# Patient Record
Sex: FEMALE | URBAN - METROPOLITAN AREA
[De-identification: names, ages, dates, MRNs, and addresses within clinical notes are randomized per-mention and may not be internally consistent; named-entity substitution may affect disease eponyms.]

---

## 2024-07-23 ENCOUNTER — TRANSCRIBE ORDERS (OUTPATIENT)
Facility: HOSPITAL | Age: 59
End: 2024-07-23

## 2024-07-23 DIAGNOSIS — R10.10 UPPER ABDOMINAL PAIN: Primary | ICD-10-CM

## 2024-07-24 ENCOUNTER — TRANSCRIBE ORDERS (OUTPATIENT)
Facility: HOSPITAL | Age: 59
End: 2024-07-24

## 2024-07-24 DIAGNOSIS — Z12.31 ENCOUNTER FOR MAMMOGRAM TO ESTABLISH BASELINE MAMMOGRAM: Primary | ICD-10-CM

## 2024-07-30 ENCOUNTER — HOSPITAL ENCOUNTER (OUTPATIENT)
Facility: HOSPITAL | Age: 59
Discharge: HOME | End: 2024-08-02
Attending: INTERNAL MEDICINE
Payer: MEDICARE

## 2024-07-30 DIAGNOSIS — R10.10 UPPER ABDOMINAL PAIN: ICD-10-CM

## 2024-07-30 PROCEDURE — 76700 US EXAM ABDOM COMPLETE: CPT

## 2024-08-01 ENCOUNTER — TRANSCRIBE ORDERS (OUTPATIENT)
Facility: HOSPITAL | Age: 59
End: 2024-08-01

## 2024-08-01 ENCOUNTER — HOSPITAL ENCOUNTER (OUTPATIENT)
Facility: HOSPITAL | Age: 59
Discharge: HOME | End: 2024-08-01
Payer: MEDICARE

## 2024-08-01 DIAGNOSIS — M25.522 LEFT ELBOW PAIN: Primary | ICD-10-CM

## 2024-08-01 DIAGNOSIS — M25.522 LEFT ELBOW PAIN: ICD-10-CM

## 2024-08-01 PROCEDURE — 72050 X-RAY EXAM NECK SPINE 4/5VWS: CPT

## 2024-08-01 PROCEDURE — 72100 X-RAY EXAM L-S SPINE 2/3 VWS: CPT

## 2024-08-01 PROCEDURE — 74018 RADEX ABDOMEN 1 VIEW: CPT

## 2024-08-01 PROCEDURE — 73080 X-RAY EXAM OF ELBOW: CPT

## 2024-08-20 ENCOUNTER — HOSPITAL ENCOUNTER (OUTPATIENT)
Facility: HOSPITAL | Age: 59
Discharge: HOME | End: 2024-08-23
Attending: INTERNAL MEDICINE
Payer: MEDICARE

## 2024-08-20 DIAGNOSIS — Z12.31 ENCOUNTER FOR MAMMOGRAM TO ESTABLISH BASELINE MAMMOGRAM: ICD-10-CM

## 2024-08-20 PROCEDURE — 77063 BREAST TOMOSYNTHESIS BI: CPT

## 2024-08-30 ENCOUNTER — TELEPHONE (OUTPATIENT)
Age: 59
End: 2024-08-30

## 2024-09-03 PROBLEM — N20.0 RENAL STONE: Status: ACTIVE | Noted: 2024-09-03

## 2024-09-04 ENCOUNTER — OFFICE VISIT (OUTPATIENT)
Age: 59
End: 2024-09-04

## 2024-09-04 VITALS
HEART RATE: 76 BPM | DIASTOLIC BLOOD PRESSURE: 68 MMHG | HEIGHT: 64 IN | WEIGHT: 147 LBS | SYSTOLIC BLOOD PRESSURE: 102 MMHG | BODY MASS INDEX: 25.1 KG/M2

## 2024-09-04 DIAGNOSIS — R82.81 PYURIA: ICD-10-CM

## 2024-09-04 DIAGNOSIS — N20.0 RENAL STONE: Primary | ICD-10-CM

## 2024-09-04 DIAGNOSIS — N23 RENAL COLIC ON RIGHT SIDE: ICD-10-CM

## 2024-09-04 LAB
BILIRUBIN, URINE, POC: NEGATIVE
BLOOD URINE, POC: NEGATIVE
GLUCOSE URINE, POC: NEGATIVE
KETONES, URINE, POC: NEGATIVE
LEUKOCYTE ESTERASE, URINE, POC: NORMAL
NITRITE, URINE, POC: NEGATIVE
PH, URINE, POC: 5.5 (ref 4.6–8)
PROTEIN,URINE, POC: NEGATIVE
SPECIFIC GRAVITY, URINE, POC: 1.02 (ref 1–1.03)
URINALYSIS CLARITY, POC: CLEAR
URINALYSIS COLOR, POC: YELLOW
UROBILINOGEN, POC: NORMAL

## 2024-09-04 RX ORDER — OMEPRAZOLE 40 MG/1
1 CAPSULE, DELAYED RELEASE ORAL
COMMUNITY

## 2024-09-04 RX ORDER — TRAZODONE HYDROCHLORIDE 100 MG/1
TABLET ORAL
COMMUNITY

## 2024-09-04 RX ORDER — SEMAGLUTIDE 1.34 MG/ML
INJECTION, SOLUTION SUBCUTANEOUS
COMMUNITY
Start: 2024-07-11

## 2024-09-04 RX ORDER — LINACLOTIDE 290 UG/1
CAPSULE, GELATIN COATED ORAL
COMMUNITY
Start: 2020-10-07

## 2024-09-04 RX ORDER — KETOROLAC TROMETHAMINE 10 MG/1
10 TABLET, FILM COATED ORAL EVERY 6 HOURS PRN
Qty: 20 TABLET | Refills: 0 | Status: SHIPPED | OUTPATIENT
Start: 2024-09-04

## 2024-09-04 RX ORDER — METFORMIN HCL 500 MG
1 TABLET, EXTENDED RELEASE 24 HR ORAL 2 TIMES DAILY
COMMUNITY

## 2024-09-04 RX ORDER — LURASIDONE HYDROCHLORIDE 60 MG/1
TABLET, FILM COATED ORAL
COMMUNITY

## 2024-09-04 RX ORDER — OXYCODONE HYDROCHLORIDE 10 MG/1
TABLET ORAL
COMMUNITY
Start: 2024-08-20

## 2024-09-04 RX ORDER — SIMVASTATIN 40 MG
TABLET ORAL
COMMUNITY
Start: 2024-08-01

## 2024-09-04 RX ORDER — CLONAZEPAM 0.5 MG/1
TABLET ORAL
COMMUNITY
Start: 2024-08-28

## 2024-09-04 RX ORDER — CELECOXIB 200 MG/1
200 CAPSULE ORAL DAILY
COMMUNITY

## 2024-09-04 RX ORDER — CIPROFLOXACIN 500 MG/1
500 TABLET, FILM COATED ORAL 2 TIMES DAILY
Qty: 20 TABLET | Refills: 0 | Status: SHIPPED | OUTPATIENT
Start: 2024-09-04 | End: 2024-09-14

## 2024-09-04 RX ORDER — LEVOTHYROXINE SODIUM 25 UG/1
TABLET ORAL
COMMUNITY

## 2024-09-04 RX ORDER — PRAZOSIN HYDROCHLORIDE 1 MG/1
CAPSULE ORAL
COMMUNITY
Start: 2024-08-28

## 2024-09-04 RX ORDER — BISACODYL 5 MG/1
TABLET, DELAYED RELEASE ORAL
COMMUNITY
Start: 2024-04-22

## 2024-09-04 ASSESSMENT — ENCOUNTER SYMPTOMS: BACK PAIN: 1

## 2024-09-04 NOTE — PROGRESS NOTES
ciprofloxacin (CIPRO) 500 MG tablet; Take 1 tablet by mouth 2 times daily for 10 days, Disp-20 tablet, R-0Normal        Reed White MD      Please note that portions of this note was potentially completed with Dragon dictation, the computer voice recognition software.  Quite often unanticipated grammatical, syntax, homophones, and other interpretive errors are inadvertently transcribed by the computer software.  Please disregard these errors.  Please excuse any errors that have escaped final proofreading.  Thank you.

## 2024-09-06 LAB — BACTERIA UR CULT: NO GROWTH

## 2024-09-10 ENCOUNTER — TELEPHONE (OUTPATIENT)
Age: 59
End: 2024-09-10

## 2024-09-11 ENCOUNTER — HOSPITAL ENCOUNTER (OUTPATIENT)
Facility: HOSPITAL | Age: 59
Discharge: HOME | End: 2024-09-14
Attending: UROLOGY
Payer: MEDICARE

## 2024-09-11 DIAGNOSIS — N20.0 RENAL STONE: ICD-10-CM

## 2024-09-11 DIAGNOSIS — R82.81 PYURIA: ICD-10-CM

## 2024-09-11 PROCEDURE — 74176 CT ABD & PELVIS W/O CONTRAST: CPT

## 2024-09-18 ENCOUNTER — OFFICE VISIT (OUTPATIENT)
Age: 59
End: 2024-09-18

## 2024-09-18 ENCOUNTER — TELEPHONE (OUTPATIENT)
Age: 59
End: 2024-09-18

## 2024-09-18 VITALS — HEART RATE: 81 BPM | DIASTOLIC BLOOD PRESSURE: 76 MMHG | SYSTOLIC BLOOD PRESSURE: 117 MMHG

## 2024-09-18 DIAGNOSIS — N20.0 RENAL STONE: Primary | ICD-10-CM

## 2024-09-18 DIAGNOSIS — N23 RENAL COLIC ON RIGHT SIDE: ICD-10-CM

## 2024-09-18 LAB
BILIRUBIN, URINE, POC: NEGATIVE
BLOOD URINE, POC: NEGATIVE
GLUCOSE URINE, POC: NEGATIVE
KETONES, URINE, POC: NEGATIVE
LEUKOCYTE ESTERASE, URINE, POC: ABNORMAL
NITRITE, URINE, POC: NEGATIVE
PH, URINE, POC: 7 (ref 4.6–8)
PROTEIN,URINE, POC: NEGATIVE
SPECIFIC GRAVITY, URINE, POC: 1.01 (ref 1–1.03)
URINALYSIS CLARITY, POC: CLEAR
URINALYSIS COLOR, POC: YELLOW
UROBILINOGEN, POC: ABNORMAL

## 2024-09-19 ENCOUNTER — TELEPHONE (OUTPATIENT)
Age: 59
End: 2024-09-19

## 2024-09-19 ENCOUNTER — PREP FOR PROCEDURE (OUTPATIENT)
Age: 59
End: 2024-09-19

## 2024-09-19 RX ORDER — KETOROLAC TROMETHAMINE 10 MG/1
10 TABLET, FILM COATED ORAL EVERY 6 HOURS PRN
Qty: 20 TABLET | Refills: 0 | Status: SHIPPED | OUTPATIENT
Start: 2024-09-19

## 2024-09-19 RX ORDER — KETOROLAC TROMETHAMINE 10 MG/1
10 TABLET, FILM COATED ORAL EVERY 6 HOURS PRN
Qty: 12 TABLET | Refills: 0 | Status: SHIPPED | OUTPATIENT
Start: 2024-09-19 | End: 2024-09-19 | Stop reason: SDUPTHER

## 2024-09-24 DIAGNOSIS — N20.0 RENAL STONE: Primary | ICD-10-CM

## 2024-10-03 ENCOUNTER — HOSPITAL ENCOUNTER (OUTPATIENT)
Facility: HOSPITAL | Age: 59
Discharge: HOME | End: 2024-10-06
Payer: MEDICARE

## 2024-10-03 VITALS
HEIGHT: 65 IN | HEART RATE: 83 BPM | BODY MASS INDEX: 24.66 KG/M2 | OXYGEN SATURATION: 97 % | SYSTOLIC BLOOD PRESSURE: 131 MMHG | WEIGHT: 148 LBS | TEMPERATURE: 98.7 F | RESPIRATION RATE: 18 BRPM | DIASTOLIC BLOOD PRESSURE: 90 MMHG

## 2024-10-03 LAB
ABO + RH BLD: NORMAL
ANION GAP SERPL CALC-SCNC: 6 MMOL/L (ref 2–12)
APPEARANCE UR: CLEAR
BACTERIA URNS QL MICRO: NEGATIVE /HPF
BILIRUB UR QL: NEGATIVE
BLOOD GROUP ANTIBODIES SERPL: NEGATIVE
BUN SERPL-MCNC: 16 MG/DL (ref 6–20)
BUN/CREAT SERPL: 16 (ref 12–20)
CA-I BLD-MCNC: 10 MG/DL (ref 8.5–10.1)
CHLORIDE SERPL-SCNC: 102 MMOL/L (ref 97–108)
CO2 SERPL-SCNC: 25 MMOL/L (ref 21–32)
COLOR UR: ABNORMAL
CREAT SERPL-MCNC: 1.02 MG/DL (ref 0.55–1.02)
EPITH CASTS URNS QL MICRO: ABNORMAL /LPF
ERYTHROCYTE [DISTWIDTH] IN BLOOD BY AUTOMATED COUNT: 12.5 % (ref 11.5–14.5)
GLUCOSE SERPL-MCNC: 85 MG/DL (ref 65–100)
GLUCOSE UR STRIP.AUTO-MCNC: NEGATIVE MG/DL
HCT VFR BLD AUTO: 38.8 % (ref 35–47)
HGB BLD-MCNC: 12.9 G/DL (ref 11.5–16)
HGB UR QL STRIP: ABNORMAL
KETONES UR QL STRIP.AUTO: NEGATIVE MG/DL
LEUKOCYTE ESTERASE UR QL STRIP.AUTO: NEGATIVE
MCH RBC QN AUTO: 29.5 PG (ref 26–34)
MCHC RBC AUTO-ENTMCNC: 33.2 G/DL (ref 30–36.5)
MCV RBC AUTO: 88.8 FL (ref 80–99)
MRSA DNA SPEC QL NAA+PROBE: NOT DETECTED
MUCOUS THREADS URNS QL MICRO: ABNORMAL /LPF
NITRITE UR QL STRIP.AUTO: NEGATIVE
NRBC # BLD: 0 K/UL (ref 0–0.01)
NRBC BLD-RTO: 0 PER 100 WBC
PH UR STRIP: 6 (ref 5–8)
PLATELET # BLD AUTO: 349 K/UL (ref 150–400)
PMV BLD AUTO: 8.6 FL (ref 8.9–12.9)
POTASSIUM SERPL-SCNC: 4.3 MMOL/L (ref 3.5–5.1)
PROT UR STRIP-MCNC: NEGATIVE MG/DL
RBC # BLD AUTO: 4.37 M/UL (ref 3.8–5.2)
RBC #/AREA URNS HPF: ABNORMAL /HPF (ref 0–5)
SODIUM SERPL-SCNC: 133 MMOL/L (ref 136–145)
SP GR UR REFRACTOMETRY: 1.01 (ref 1–1.03)
SPECIMEN EXP DATE BLD: NORMAL
UROBILINOGEN UR QL STRIP.AUTO: 0.1 EU/DL (ref 0.1–1)
WBC # BLD AUTO: 4.6 K/UL (ref 3.6–11)
WBC URNS QL MICRO: ABNORMAL /HPF (ref 0–4)

## 2024-10-03 PROCEDURE — 85027 COMPLETE CBC AUTOMATED: CPT

## 2024-10-03 PROCEDURE — 81001 URINALYSIS AUTO W/SCOPE: CPT

## 2024-10-03 PROCEDURE — 86850 RBC ANTIBODY SCREEN: CPT

## 2024-10-03 PROCEDURE — 36415 COLL VENOUS BLD VENIPUNCTURE: CPT

## 2024-10-03 PROCEDURE — 87086 URINE CULTURE/COLONY COUNT: CPT

## 2024-10-03 PROCEDURE — 93005 ELECTROCARDIOGRAM TRACING: CPT | Performed by: UROLOGY

## 2024-10-03 PROCEDURE — 83036 HEMOGLOBIN GLYCOSYLATED A1C: CPT

## 2024-10-03 PROCEDURE — 86900 BLOOD TYPING SEROLOGIC ABO: CPT

## 2024-10-03 PROCEDURE — 87641 MR-STAPH DNA AMP PROBE: CPT

## 2024-10-03 PROCEDURE — 80048 BASIC METABOLIC PNL TOTAL CA: CPT

## 2024-10-03 PROCEDURE — 86901 BLOOD TYPING SEROLOGIC RH(D): CPT

## 2024-10-03 RX ORDER — LURASIDONE HYDROCHLORIDE 80 MG/1
80 TABLET, FILM COATED ORAL 2 TIMES DAILY
Status: ON HOLD | COMMUNITY
End: 2024-10-09 | Stop reason: HOSPADM

## 2024-10-03 RX ORDER — BACLOFEN 10 MG/1
10 TABLET ORAL 3 TIMES DAILY
Status: ON HOLD | COMMUNITY
End: 2024-10-09 | Stop reason: HOSPADM

## 2024-10-03 RX ORDER — OXYCODONE AND ACETAMINOPHEN 10; 325 MG/1; MG/1
4 TABLET ORAL EVERY 4 HOURS PRN
Status: ON HOLD | COMMUNITY
End: 2024-10-09 | Stop reason: HOSPADM

## 2024-10-03 ASSESSMENT — PAIN SCALES - GENERAL: PAINLEVEL_OUTOF10: 3

## 2024-10-03 NOTE — PERIOP NOTE
1603- Dr. Hanson anesthesiologist called made aware of patient's EKG result from today 10/03/24. Dr. Hanson stated to have patient's PCP review and sign off with medical clearance if able.     Dr. Santillan's office called confirmed patient is established, fax number received, clearance form and EKG result faxed, awaiting clearance.     Dr. White's office called spoke with his  Lynne made her aware of information above in regards to EKG and made aware of abnormal lab BMP-- Sodium 133. Lynne stated she will make Dr. White aware and call back to PAT if further order received.     Will follow up with PCP office.

## 2024-10-03 NOTE — PERIOP NOTE
1418- Patient stated during PAT that she is currently holding Celebrex prior to surgery scheduled for 10/8/24.

## 2024-10-04 LAB
BACTERIA SPEC CULT: NORMAL
EKG ATRIAL RATE: 82 BPM
EKG DIAGNOSIS: NORMAL
EKG P AXIS: 78 DEGREES
EKG P-R INTERVAL: 174 MS
EKG Q-T INTERVAL: 354 MS
EKG QRS DURATION: 84 MS
EKG QTC CALCULATION (BAZETT): 413 MS
EKG R AXIS: 47 DEGREES
EKG T AXIS: 80 DEGREES
EKG VENTRICULAR RATE: 82 BPM
Lab: NORMAL

## 2024-10-04 NOTE — PERIOP NOTE
Followed up with Dr. White in regards to patient's Sodium level of 133. Orders received to repeat BMP DOS.     Also followed up with Dr. Santillan's office in regards to patient's medical clearance. Dalia with Dr. Santillan's office staff stated will have clearance form faxed back to PAT as soon as Dr. Santillan has had time to review.

## 2024-10-05 LAB
EST. AVERAGE GLUCOSE BLD GHB EST-MCNC: 103 MG/DL
HBA1C MFR BLD: 5.2 % (ref 4–5.6)

## 2024-10-07 ENCOUNTER — HOSPITAL ENCOUNTER (OUTPATIENT)
Facility: HOSPITAL | Age: 59
Discharge: HOME | End: 2024-10-10
Attending: UROLOGY
Payer: MEDICARE

## 2024-10-07 ENCOUNTER — ANESTHESIA EVENT (OUTPATIENT)
Facility: HOSPITAL | Age: 59
End: 2024-10-07
Payer: MEDICARE

## 2024-10-07 VITALS
RESPIRATION RATE: 18 BRPM | SYSTOLIC BLOOD PRESSURE: 104 MMHG | OXYGEN SATURATION: 98 % | DIASTOLIC BLOOD PRESSURE: 69 MMHG | HEART RATE: 74 BPM | TEMPERATURE: 98.8 F

## 2024-10-07 DIAGNOSIS — R82.81 PYURIA: ICD-10-CM

## 2024-10-07 DIAGNOSIS — N20.0 RENAL STONE: ICD-10-CM

## 2024-10-07 LAB
ANION GAP SERPL CALC-SCNC: 10 MMOL/L (ref 2–12)
BASOPHILS # BLD: 0 K/UL (ref 0–0.1)
BASOPHILS NFR BLD: 1 % (ref 0–1)
BUN SERPL-MCNC: 17 MG/DL (ref 6–20)
BUN/CREAT SERPL: 19 (ref 12–20)
CA-I BLD-MCNC: 9.8 MG/DL (ref 8.5–10.1)
CHLORIDE SERPL-SCNC: 103 MMOL/L (ref 97–108)
CO2 SERPL-SCNC: 24 MMOL/L (ref 21–32)
CREAT SERPL-MCNC: 0.9 MG/DL (ref 0.55–1.02)
DIFFERENTIAL METHOD BLD: ABNORMAL
EOSINOPHIL # BLD: 0.1 K/UL (ref 0–0.4)
EOSINOPHIL NFR BLD: 1 % (ref 0–7)
ERYTHROCYTE [DISTWIDTH] IN BLOOD BY AUTOMATED COUNT: 12.3 % (ref 11.5–14.5)
GLUCOSE BLD STRIP.AUTO-MCNC: 88 MG/DL (ref 65–100)
GLUCOSE SERPL-MCNC: 89 MG/DL (ref 65–100)
HCT VFR BLD AUTO: 36.5 % (ref 35–47)
HGB BLD-MCNC: 12.1 G/DL (ref 11.5–16)
IMM GRANULOCYTES # BLD AUTO: 0 K/UL (ref 0–0.04)
IMM GRANULOCYTES NFR BLD AUTO: 0 % (ref 0–0.5)
INR PPP: 1 (ref 0.9–1.1)
LYMPHOCYTES # BLD: 1.6 K/UL (ref 0.8–3.5)
LYMPHOCYTES NFR BLD: 24 % (ref 12–49)
MCH RBC QN AUTO: 29.1 PG (ref 26–34)
MCHC RBC AUTO-ENTMCNC: 33.2 G/DL (ref 30–36.5)
MCV RBC AUTO: 87.7 FL (ref 80–99)
MONOCYTES # BLD: 0.4 K/UL (ref 0–1)
MONOCYTES NFR BLD: 7 % (ref 5–13)
NEUTS SEG # BLD: 4.2 K/UL (ref 1.8–8)
NEUTS SEG NFR BLD: 67 % (ref 32–75)
NRBC # BLD: 0 K/UL (ref 0–0.01)
NRBC BLD-RTO: 0 PER 100 WBC
PERFORMED BY:: NORMAL
PLATELET # BLD AUTO: 328 K/UL (ref 150–400)
PMV BLD AUTO: 8.7 FL (ref 8.9–12.9)
POTASSIUM SERPL-SCNC: 4 MMOL/L (ref 3.5–5.1)
PROTHROMBIN TIME: 13.8 SEC (ref 11.9–14.6)
RBC # BLD AUTO: 4.16 M/UL (ref 3.8–5.2)
SODIUM SERPL-SCNC: 137 MMOL/L (ref 136–145)
WBC # BLD AUTO: 6.4 K/UL (ref 3.6–11)

## 2024-10-07 PROCEDURE — 82962 GLUCOSE BLOOD TEST: CPT

## 2024-10-07 PROCEDURE — 85025 COMPLETE CBC W/AUTO DIFF WBC: CPT

## 2024-10-07 PROCEDURE — 80048 BASIC METABOLIC PNL TOTAL CA: CPT

## 2024-10-07 PROCEDURE — 6360000004 HC RX CONTRAST MEDICATION: Performed by: UROLOGY

## 2024-10-07 PROCEDURE — 99152 MOD SED SAME PHYS/QHP 5/>YRS: CPT

## 2024-10-07 PROCEDURE — 2580000003 HC RX 258: Performed by: UROLOGY

## 2024-10-07 PROCEDURE — 6360000002 HC RX W HCPCS: Performed by: STUDENT IN AN ORGANIZED HEALTH CARE EDUCATION/TRAINING PROGRAM

## 2024-10-07 PROCEDURE — 85610 PROTHROMBIN TIME: CPT

## 2024-10-07 PROCEDURE — 36415 COLL VENOUS BLD VENIPUNCTURE: CPT

## 2024-10-07 PROCEDURE — 7100000010 HC PHASE II RECOVERY - FIRST 15 MIN

## 2024-10-07 PROCEDURE — 7100000011 HC PHASE II RECOVERY - ADDTL 15 MIN

## 2024-10-07 PROCEDURE — C1769 GUIDE WIRE: HCPCS

## 2024-10-07 PROCEDURE — 2580000003 HC RX 258: Performed by: STUDENT IN AN ORGANIZED HEALTH CARE EDUCATION/TRAINING PROGRAM

## 2024-10-07 PROCEDURE — 99156 MOD SED OTH PHYS/QHP 5/>YRS: CPT

## 2024-10-07 RX ORDER — SODIUM CHLORIDE 9 MG/ML
INJECTION, SOLUTION INTRAVENOUS CONTINUOUS
Status: DISCONTINUED | OUTPATIENT
Start: 2024-10-07 | End: 2024-10-11 | Stop reason: HOSPADM

## 2024-10-07 RX ORDER — FENTANYL CITRATE 50 UG/ML
INJECTION, SOLUTION INTRAMUSCULAR; INTRAVENOUS PRN
Status: COMPLETED | OUTPATIENT
Start: 2024-10-07 | End: 2024-10-07

## 2024-10-07 RX ORDER — IOPAMIDOL 755 MG/ML
5 INJECTION, SOLUTION INTRAVASCULAR
Status: COMPLETED | OUTPATIENT
Start: 2024-10-07 | End: 2024-10-07

## 2024-10-07 RX ORDER — MIDAZOLAM HYDROCHLORIDE 2 MG/2ML
INJECTION, SOLUTION INTRAMUSCULAR; INTRAVENOUS PRN
Status: COMPLETED | OUTPATIENT
Start: 2024-10-07 | End: 2024-10-07

## 2024-10-07 RX ADMIN — SODIUM CHLORIDE: 9 INJECTION, SOLUTION INTRAVENOUS at 10:39

## 2024-10-07 RX ADMIN — FENTANYL CITRATE 50 MCG: 50 INJECTION, SOLUTION INTRAMUSCULAR; INTRAVENOUS at 12:02

## 2024-10-07 RX ADMIN — CEFTRIAXONE SODIUM 1000 MG: 1 INJECTION, POWDER, FOR SOLUTION INTRAMUSCULAR; INTRAVENOUS at 12:28

## 2024-10-07 RX ADMIN — MIDAZOLAM HYDROCHLORIDE 1 MG: 1 INJECTION, SOLUTION INTRAMUSCULAR; INTRAVENOUS at 11:56

## 2024-10-07 RX ADMIN — MIDAZOLAM HYDROCHLORIDE 1 MG: 1 INJECTION, SOLUTION INTRAMUSCULAR; INTRAVENOUS at 11:51

## 2024-10-07 RX ADMIN — FENTANYL CITRATE 50 MCG: 50 INJECTION, SOLUTION INTRAMUSCULAR; INTRAVENOUS at 12:07

## 2024-10-07 RX ADMIN — FENTANYL CITRATE 50 MCG: 50 INJECTION, SOLUTION INTRAMUSCULAR; INTRAVENOUS at 11:51

## 2024-10-07 RX ADMIN — MIDAZOLAM HYDROCHLORIDE 1 MG: 1 INJECTION, SOLUTION INTRAMUSCULAR; INTRAVENOUS at 12:02

## 2024-10-07 RX ADMIN — IOPAMIDOL 5 ML: 755 INJECTION, SOLUTION INTRAVENOUS at 12:41

## 2024-10-07 RX ADMIN — FENTANYL CITRATE 25 MCG: 50 INJECTION, SOLUTION INTRAMUSCULAR; INTRAVENOUS at 11:56

## 2024-10-07 RX ADMIN — MIDAZOLAM HYDROCHLORIDE 1 MG: 1 INJECTION, SOLUTION INTRAMUSCULAR; INTRAVENOUS at 12:07

## 2024-10-07 ASSESSMENT — PAIN - FUNCTIONAL ASSESSMENT
PAIN_FUNCTIONAL_ASSESSMENT: NONE - DENIES PAIN
PAIN_FUNCTIONAL_ASSESSMENT: 0-10
PAIN_FUNCTIONAL_ASSESSMENT: NONE - DENIES PAIN

## 2024-10-07 ASSESSMENT — PAIN DESCRIPTION - DESCRIPTORS: DESCRIPTORS: DISCOMFORT

## 2024-10-07 NOTE — ANESTHESIA PRE PROCEDURE
Department of Anesthesiology  Preprocedure Note       Name:  Yvonne Guo   Age:  58 y.o.  :  1965                                          MRN:  [id number]         Date:  10/7/2024      Surgeon: Surgeon(s):  Reed White MD    Procedure: Procedure(s):  RIGHT PERCUTANEOUS ULTRASONIC NEPHROLITHOTOMY CYSTOURETHROSCOPY URETEROSCOPY LITHOTRIPSY RIGHT URETERAL STENT PLACEMENT WITH ANTEGRADE PYELOGRAM    Medications prior to admission:   Prior to Admission medications    Medication Sig Start Date End Date Taking? Authorizing Provider   baclofen (LIORESAL) 10 MG tablet Take 1 tablet by mouth 3 times daily    Mary Dee MD   lurasidone (LATUDA) 80 MG TABS tablet Take 1 tablet by mouth in the morning and at bedtime    Mary Dee MD   oxyCODONE-acetaminophen (PERCOCET)  MG per tablet Take 1 tablet by mouth every 4 hours as needed for Pain.    Mary Dee MD   ketorolac (TORADOL) 10 MG tablet Take 1 tablet by mouth every 6 hours as needed for Pain  Patient not taking: Reported on 10/3/2024 9/19/24   Reed White MD   bisacodyl (DULCOLAX) 5 MG EC tablet Take 1 tablet by mouth daily as needed for Constipation 24   Mary Dee MD   clonazePAM (KLONOPIN) 0.5 MG tablet Take 1 tablet by mouth 2 times daily as needed for Anxiety. 24   Mary Dee MD   LATUDA 60 MG TABS tablet 1 tablet in the evening with food Orally q HS  Patient not taking: Reported on 10/3/2024    Mary Dee MD   levothyroxine (SYNTHROID) 25 MCG tablet Take 1 tablet by mouth Daily    Mary Dee MD   LINZESS 290 MCG CAPS capsule Take 1 capsule by mouth every morning (before breakfast) 10/7/20   Mary Dee MD   celecoxib (CELEBREX) 200 MG capsule Take 1 capsule by mouth daily    Mary Dee MD   metFORMIN (GLUCOPHAGE-XR) 500 MG extended release tablet Take 1 tablet by mouth in the morning and at bedtime    Mary Dee MD

## 2024-10-07 NOTE — H&P
Radiology History and Physical    Patient: Yvonne Guo 58 y.o. female       Chief Complaint: No chief complaint on file.      History of Present Illness: 58 year old woman with right nephrolithiasis presents for nephrostomy tube, in preparation for percutaneous lithotripsy. Recently treated for UTI. No fevers, shortness of breath, or chest pain.    History:    Past Medical History:   Diagnosis Date    Bipolar 1 disorder (HCC)     Diabetes mellitus (HCC)     High cholesterol     Kidney stone     Thyroid disease      Family History   Problem Relation Age of Onset    Kidney stones Father     Alzheimer's Disease Father     Kidney stones Mother     Pulmonary Embolism Mother      Social History     Socioeconomic History    Marital status: Single     Spouse name: Not on file    Number of children: Not on file    Years of education: Not on file    Highest education level: Not on file   Occupational History    Not on file   Tobacco Use    Smoking status: Never    Smokeless tobacco: Never   Vaping Use    Vaping status: Every Day    Substances: THC   Substance and Sexual Activity    Alcohol use: Never    Drug use: Yes     Types: Marijuana (Weed)     Comment: Medical card    Sexual activity: Not on file   Other Topics Concern    Not on file   Social History Narrative    Not on file     Social Determinants of Health     Financial Resource Strain: Not on file   Food Insecurity: Not on file   Transportation Needs: Not on file   Physical Activity: Not on file   Stress: Not on file   Social Connections: Not on file   Intimate Partner Violence: Not on file   Housing Stability: Not on file       Allergies:   Allergies   Allergen Reactions    Prozac [Fluoxetine] Hallucinations    Amitriptyline Dizziness or Vertigo    Prazosin Swelling     Patient stated caused swelling of lower legs.     Topamax [Topiramate] Other (See Comments)     Caused issues with eye sight     Wellbutrin [Bupropion] Hives       Current Medications:  Current

## 2024-10-07 NOTE — PERIOP NOTE
Medical clearance/EKG review received from Dr. Santillan patient's PCP, placed on patient's chart.

## 2024-10-08 ENCOUNTER — APPOINTMENT (OUTPATIENT)
Facility: HOSPITAL | Age: 59
End: 2024-10-08
Attending: UROLOGY
Payer: MEDICARE

## 2024-10-08 ENCOUNTER — ANESTHESIA (OUTPATIENT)
Facility: HOSPITAL | Age: 59
End: 2024-10-08
Payer: MEDICARE

## 2024-10-08 ENCOUNTER — HOSPITAL ENCOUNTER (OUTPATIENT)
Facility: HOSPITAL | Age: 59
Discharge: HOME | End: 2024-10-10
Attending: UROLOGY | Admitting: UROLOGY
Payer: MEDICARE

## 2024-10-08 DIAGNOSIS — N20.0 CALCULUS OF KIDNEY: Primary | ICD-10-CM

## 2024-10-08 DIAGNOSIS — N20.0 RENAL STONE: ICD-10-CM

## 2024-10-08 LAB
GLUCOSE BLD STRIP.AUTO-MCNC: 117 MG/DL (ref 65–100)
GLUCOSE BLD STRIP.AUTO-MCNC: 117 MG/DL (ref 65–100)
GLUCOSE BLD STRIP.AUTO-MCNC: 121 MG/DL (ref 65–100)
GLUCOSE BLD STRIP.AUTO-MCNC: 127 MG/DL (ref 65–100)
GLUCOSE BLD STRIP.AUTO-MCNC: 127 MG/DL (ref 65–100)
PERFORMED BY:: ABNORMAL

## 2024-10-08 PROCEDURE — C1769 GUIDE WIRE: HCPCS | Performed by: UROLOGY

## 2024-10-08 PROCEDURE — 76000 FLUOROSCOPY <1 HR PHYS/QHP: CPT

## 2024-10-08 PROCEDURE — 82962 GLUCOSE BLOOD TEST: CPT

## 2024-10-08 PROCEDURE — C1713 ANCHOR/SCREW BN/BN,TIS/BN: HCPCS | Performed by: UROLOGY

## 2024-10-08 PROCEDURE — 6360000002 HC RX W HCPCS: Performed by: UROLOGY

## 2024-10-08 PROCEDURE — 7100000000 HC PACU RECOVERY - FIRST 15 MIN: Performed by: UROLOGY

## 2024-10-08 PROCEDURE — 87086 URINE CULTURE/COLONY COUNT: CPT

## 2024-10-08 PROCEDURE — 2580000003 HC RX 258: Performed by: UROLOGY

## 2024-10-08 PROCEDURE — C1726 CATH, BAL DIL, NON-VASCULAR: HCPCS | Performed by: UROLOGY

## 2024-10-08 PROCEDURE — 50684 INJECTION FOR URETER X-RAY: CPT | Performed by: UROLOGY

## 2024-10-08 PROCEDURE — 6360000002 HC RX W HCPCS: Performed by: NURSE ANESTHETIST, CERTIFIED REGISTERED

## 2024-10-08 PROCEDURE — 7100000001 HC PACU RECOVERY - ADDTL 15 MIN: Performed by: UROLOGY

## 2024-10-08 PROCEDURE — 2720000010 HC SURG SUPPLY STERILE: Performed by: UROLOGY

## 2024-10-08 PROCEDURE — 6370000000 HC RX 637 (ALT 250 FOR IP): Performed by: UROLOGY

## 2024-10-08 PROCEDURE — 36415 COLL VENOUS BLD VENIPUNCTURE: CPT

## 2024-10-08 PROCEDURE — 3600000004 HC SURGERY LEVEL 4 BASE: Performed by: UROLOGY

## 2024-10-08 PROCEDURE — 82360 CALCULUS ASSAY QUANT: CPT

## 2024-10-08 PROCEDURE — 3700000001 HC ADD 15 MINUTES (ANESTHESIA): Performed by: UROLOGY

## 2024-10-08 PROCEDURE — 2709999900 HC NON-CHARGEABLE SUPPLY: Performed by: UROLOGY

## 2024-10-08 PROCEDURE — 6370000000 HC RX 637 (ALT 250 FOR IP): Performed by: ANESTHESIOLOGY

## 2024-10-08 PROCEDURE — 6370000000 HC RX 637 (ALT 250 FOR IP): Performed by: NURSE PRACTITIONER

## 2024-10-08 PROCEDURE — 3700000000 HC ANESTHESIA ATTENDED CARE: Performed by: UROLOGY

## 2024-10-08 PROCEDURE — 6360000002 HC RX W HCPCS: Performed by: ANESTHESIOLOGY

## 2024-10-08 PROCEDURE — 2580000003 HC RX 258: Performed by: NURSE PRACTITIONER

## 2024-10-08 PROCEDURE — 2500000003 HC RX 250 WO HCPCS: Performed by: NURSE ANESTHETIST, CERTIFIED REGISTERED

## 2024-10-08 PROCEDURE — 3600000014 HC SURGERY LEVEL 4 ADDTL 15MIN: Performed by: UROLOGY

## 2024-10-08 PROCEDURE — 6360000004 HC RX CONTRAST MEDICATION: Performed by: UROLOGY

## 2024-10-08 PROCEDURE — 50081 PERQ NL/PL LITHOTRP CPLX>2CM: CPT | Performed by: UROLOGY

## 2024-10-08 RX ORDER — DEXTROSE MONOHYDRATE 100 MG/ML
INJECTION, SOLUTION INTRAVENOUS CONTINUOUS PRN
Status: DISCONTINUED | OUTPATIENT
Start: 2024-10-08 | End: 2024-10-10 | Stop reason: HOSPADM

## 2024-10-08 RX ORDER — ATROPA BELLADONNA AND OPIUM 16.2; 6 MG/1; MG/1
60 SUPPOSITORY RECTAL ONCE
Status: COMPLETED | OUTPATIENT
Start: 2024-10-08 | End: 2024-10-08

## 2024-10-08 RX ORDER — LEVOTHYROXINE SODIUM 25 UG/1
25 TABLET ORAL DAILY
Status: DISCONTINUED | OUTPATIENT
Start: 2024-10-09 | End: 2024-10-10 | Stop reason: HOSPADM

## 2024-10-08 RX ORDER — GLUCAGON 1 MG/ML
1 KIT INJECTION PRN
Status: DISCONTINUED | OUTPATIENT
Start: 2024-10-08 | End: 2024-10-08 | Stop reason: HOSPADM

## 2024-10-08 RX ORDER — SODIUM CHLORIDE 0.9 % (FLUSH) 0.9 %
5-40 SYRINGE (ML) INJECTION EVERY 12 HOURS SCHEDULED
Status: DISCONTINUED | OUTPATIENT
Start: 2024-10-08 | End: 2024-10-08 | Stop reason: HOSPADM

## 2024-10-08 RX ORDER — FENTANYL CITRATE 0.05 MG/ML
50 INJECTION, SOLUTION INTRAMUSCULAR; INTRAVENOUS EVERY 5 MIN PRN
Status: DISCONTINUED | OUTPATIENT
Start: 2024-10-08 | End: 2024-10-08 | Stop reason: HOSPADM

## 2024-10-08 RX ORDER — OXYCODONE HYDROCHLORIDE 5 MG/1
10 TABLET ORAL PRN
Status: DISCONTINUED | OUTPATIENT
Start: 2024-10-08 | End: 2024-10-08 | Stop reason: HOSPADM

## 2024-10-08 RX ORDER — SODIUM CHLORIDE 450 MG/100ML
INJECTION, SOLUTION INTRAVENOUS CONTINUOUS
Status: DISCONTINUED | OUTPATIENT
Start: 2024-10-08 | End: 2024-10-09

## 2024-10-08 RX ORDER — OXYCODONE HYDROCHLORIDE 5 MG/1
5 TABLET ORAL EVERY 4 HOURS PRN
Status: DISCONTINUED | OUTPATIENT
Start: 2024-10-08 | End: 2024-10-10 | Stop reason: HOSPADM

## 2024-10-08 RX ORDER — ATORVASTATIN CALCIUM 20 MG/1
20 TABLET, FILM COATED ORAL DAILY
Status: DISCONTINUED | OUTPATIENT
Start: 2024-10-09 | End: 2024-10-10 | Stop reason: HOSPADM

## 2024-10-08 RX ORDER — IOPAMIDOL 755 MG/ML
INJECTION, SOLUTION INTRAVASCULAR PRN
Status: DISCONTINUED | OUTPATIENT
Start: 2024-10-08 | End: 2024-10-08 | Stop reason: ALTCHOICE

## 2024-10-08 RX ORDER — ROCURONIUM BROMIDE 10 MG/ML
INJECTION, SOLUTION INTRAVENOUS
Status: DISCONTINUED | OUTPATIENT
Start: 2024-10-08 | End: 2024-10-08 | Stop reason: SDUPTHER

## 2024-10-08 RX ORDER — ACETAMINOPHEN 325 MG/1
650 TABLET ORAL EVERY 4 HOURS PRN
Status: DISCONTINUED | OUTPATIENT
Start: 2024-10-08 | End: 2024-10-10 | Stop reason: HOSPADM

## 2024-10-08 RX ORDER — LEVOFLOXACIN 5 MG/ML
500 INJECTION, SOLUTION INTRAVENOUS ONCE
Status: COMPLETED | OUTPATIENT
Start: 2024-10-08 | End: 2024-10-08

## 2024-10-08 RX ORDER — HYDROMORPHONE HYDROCHLORIDE 1 MG/ML
0.5 INJECTION, SOLUTION INTRAMUSCULAR; INTRAVENOUS; SUBCUTANEOUS EVERY 5 MIN PRN
Status: DISCONTINUED | OUTPATIENT
Start: 2024-10-08 | End: 2024-10-08 | Stop reason: HOSPADM

## 2024-10-08 RX ORDER — SODIUM CHLORIDE, SODIUM LACTATE, POTASSIUM CHLORIDE, CALCIUM CHLORIDE 600; 310; 30; 20 MG/100ML; MG/100ML; MG/100ML; MG/100ML
INJECTION, SOLUTION INTRAVENOUS ONCE
Status: DISCONTINUED | OUTPATIENT
Start: 2024-10-08 | End: 2024-10-08 | Stop reason: HOSPADM

## 2024-10-08 RX ORDER — METOCLOPRAMIDE HYDROCHLORIDE 5 MG/ML
10 INJECTION INTRAMUSCULAR; INTRAVENOUS
Status: DISCONTINUED | OUTPATIENT
Start: 2024-10-08 | End: 2024-10-08 | Stop reason: HOSPADM

## 2024-10-08 RX ORDER — SCOLOPAMINE TRANSDERMAL SYSTEM 1 MG/1
1 PATCH, EXTENDED RELEASE TRANSDERMAL
Status: DISCONTINUED | OUTPATIENT
Start: 2024-10-08 | End: 2024-10-08 | Stop reason: HOSPADM

## 2024-10-08 RX ORDER — ONDANSETRON 2 MG/ML
4 INJECTION INTRAMUSCULAR; INTRAVENOUS
Status: COMPLETED | OUTPATIENT
Start: 2024-10-08 | End: 2024-10-08

## 2024-10-08 RX ORDER — DIPHENHYDRAMINE HYDROCHLORIDE 50 MG/ML
12.5 INJECTION INTRAMUSCULAR; INTRAVENOUS
Status: DISCONTINUED | OUTPATIENT
Start: 2024-10-08 | End: 2024-10-08 | Stop reason: HOSPADM

## 2024-10-08 RX ORDER — PROPOFOL 10 MG/ML
INJECTION, EMULSION INTRAVENOUS
Status: DISCONTINUED | OUTPATIENT
Start: 2024-10-08 | End: 2024-10-08 | Stop reason: SDUPTHER

## 2024-10-08 RX ORDER — SODIUM CHLORIDE 9 MG/ML
INJECTION, SOLUTION INTRAVENOUS PRN
Status: DISCONTINUED | OUTPATIENT
Start: 2024-10-08 | End: 2024-10-08 | Stop reason: HOSPADM

## 2024-10-08 RX ORDER — KETOROLAC TROMETHAMINE 30 MG/ML
INJECTION, SOLUTION INTRAMUSCULAR; INTRAVENOUS
Status: DISCONTINUED | OUTPATIENT
Start: 2024-10-08 | End: 2024-10-08 | Stop reason: SDUPTHER

## 2024-10-08 RX ORDER — DEXAMETHASONE SODIUM PHOSPHATE 4 MG/ML
INJECTION, SOLUTION INTRA-ARTICULAR; INTRALESIONAL; INTRAMUSCULAR; INTRAVENOUS; SOFT TISSUE
Status: DISCONTINUED | OUTPATIENT
Start: 2024-10-08 | End: 2024-10-08 | Stop reason: SDUPTHER

## 2024-10-08 RX ORDER — HYDRALAZINE HYDROCHLORIDE 20 MG/ML
10 INJECTION INTRAMUSCULAR; INTRAVENOUS
Status: DISCONTINUED | OUTPATIENT
Start: 2024-10-08 | End: 2024-10-08 | Stop reason: HOSPADM

## 2024-10-08 RX ORDER — IPRATROPIUM BROMIDE AND ALBUTEROL SULFATE 2.5; .5 MG/3ML; MG/3ML
1 SOLUTION RESPIRATORY (INHALATION)
Status: DISCONTINUED | OUTPATIENT
Start: 2024-10-08 | End: 2024-10-08 | Stop reason: HOSPADM

## 2024-10-08 RX ORDER — MEPERIDINE HYDROCHLORIDE 25 MG/ML
12.5 INJECTION INTRAMUSCULAR; INTRAVENOUS; SUBCUTANEOUS EVERY 5 MIN PRN
Status: DISCONTINUED | OUTPATIENT
Start: 2024-10-08 | End: 2024-10-08 | Stop reason: HOSPADM

## 2024-10-08 RX ORDER — LIDOCAINE 4 G/G
1 PATCH TOPICAL AS NEEDED
Status: DISCONTINUED | OUTPATIENT
Start: 2024-10-08 | End: 2024-10-08 | Stop reason: HOSPADM

## 2024-10-08 RX ORDER — MIDAZOLAM HYDROCHLORIDE 1 MG/ML
INJECTION INTRAMUSCULAR; INTRAVENOUS
Status: DISCONTINUED | OUTPATIENT
Start: 2024-10-08 | End: 2024-10-08 | Stop reason: SDUPTHER

## 2024-10-08 RX ORDER — BUPIVACAINE HYDROCHLORIDE 2.5 MG/ML
INJECTION, SOLUTION EPIDURAL; INFILTRATION; INTRACAUDAL PRN
Status: DISCONTINUED | OUTPATIENT
Start: 2024-10-08 | End: 2024-10-08 | Stop reason: ALTCHOICE

## 2024-10-08 RX ORDER — LIDOCAINE HYDROCHLORIDE 20 MG/ML
INJECTION, SOLUTION EPIDURAL; INFILTRATION; INTRACAUDAL; PERINEURAL
Status: DISCONTINUED | OUTPATIENT
Start: 2024-10-08 | End: 2024-10-08 | Stop reason: SDUPTHER

## 2024-10-08 RX ORDER — SODIUM CHLORIDE 0.9 % (FLUSH) 0.9 %
5-40 SYRINGE (ML) INJECTION PRN
Status: DISCONTINUED | OUTPATIENT
Start: 2024-10-08 | End: 2024-10-08 | Stop reason: HOSPADM

## 2024-10-08 RX ORDER — SODIUM CHLORIDE, SODIUM LACTATE, POTASSIUM CHLORIDE, CALCIUM CHLORIDE 600; 310; 30; 20 MG/100ML; MG/100ML; MG/100ML; MG/100ML
INJECTION, SOLUTION INTRAVENOUS CONTINUOUS
Status: DISCONTINUED | OUTPATIENT
Start: 2024-10-08 | End: 2024-10-08 | Stop reason: HOSPADM

## 2024-10-08 RX ORDER — ONDANSETRON 2 MG/ML
4 INJECTION INTRAMUSCULAR; INTRAVENOUS EVERY 6 HOURS PRN
Status: DISCONTINUED | OUTPATIENT
Start: 2024-10-08 | End: 2024-10-10 | Stop reason: HOSPADM

## 2024-10-08 RX ORDER — LURASIDONE HYDROCHLORIDE 80 MG/1
80 TABLET, FILM COATED ORAL 2 TIMES DAILY
Status: DISCONTINUED | OUTPATIENT
Start: 2024-10-08 | End: 2024-10-10 | Stop reason: HOSPADM

## 2024-10-08 RX ORDER — LORAZEPAM 2 MG/ML
0.5 INJECTION INTRAMUSCULAR
Status: DISCONTINUED | OUTPATIENT
Start: 2024-10-08 | End: 2024-10-08 | Stop reason: HOSPADM

## 2024-10-08 RX ORDER — PANTOPRAZOLE SODIUM 40 MG/1
40 TABLET, DELAYED RELEASE ORAL
Status: DISCONTINUED | OUTPATIENT
Start: 2024-10-09 | End: 2024-10-10 | Stop reason: HOSPADM

## 2024-10-08 RX ORDER — PHENYLEPHRINE HCL IN 0.9% NACL 1 MG/10 ML
SYRINGE (ML) INTRAVENOUS
Status: DISCONTINUED | OUTPATIENT
Start: 2024-10-08 | End: 2024-10-08 | Stop reason: SDUPTHER

## 2024-10-08 RX ORDER — INSULIN LISPRO 100 [IU]/ML
0-8 INJECTION, SOLUTION INTRAVENOUS; SUBCUTANEOUS EVERY 4 HOURS
Status: DISCONTINUED | OUTPATIENT
Start: 2024-10-08 | End: 2024-10-10 | Stop reason: HOSPADM

## 2024-10-08 RX ORDER — NALOXONE HYDROCHLORIDE 0.4 MG/ML
INJECTION, SOLUTION INTRAMUSCULAR; INTRAVENOUS; SUBCUTANEOUS PRN
Status: DISCONTINUED | OUTPATIENT
Start: 2024-10-08 | End: 2024-10-08 | Stop reason: HOSPADM

## 2024-10-08 RX ORDER — DEXTROSE MONOHYDRATE 100 MG/ML
INJECTION, SOLUTION INTRAVENOUS CONTINUOUS PRN
Status: DISCONTINUED | OUTPATIENT
Start: 2024-10-08 | End: 2024-10-08 | Stop reason: HOSPADM

## 2024-10-08 RX ORDER — FUROSEMIDE 10 MG/ML
INJECTION INTRAMUSCULAR; INTRAVENOUS
Status: DISCONTINUED | OUTPATIENT
Start: 2024-10-08 | End: 2024-10-08 | Stop reason: SDUPTHER

## 2024-10-08 RX ORDER — OXYCODONE HYDROCHLORIDE 5 MG/1
5 TABLET ORAL PRN
Status: DISCONTINUED | OUTPATIENT
Start: 2024-10-08 | End: 2024-10-08 | Stop reason: HOSPADM

## 2024-10-08 RX ORDER — LABETALOL HYDROCHLORIDE 5 MG/ML
10 INJECTION, SOLUTION INTRAVENOUS
Status: DISCONTINUED | OUTPATIENT
Start: 2024-10-08 | End: 2024-10-08 | Stop reason: HOSPADM

## 2024-10-08 RX ORDER — FENTANYL CITRATE 50 UG/ML
INJECTION, SOLUTION INTRAMUSCULAR; INTRAVENOUS
Status: DISCONTINUED | OUTPATIENT
Start: 2024-10-08 | End: 2024-10-08 | Stop reason: SDUPTHER

## 2024-10-08 RX ORDER — ONDANSETRON 2 MG/ML
INJECTION INTRAMUSCULAR; INTRAVENOUS
Status: DISCONTINUED | OUTPATIENT
Start: 2024-10-08 | End: 2024-10-08 | Stop reason: SDUPTHER

## 2024-10-08 RX ORDER — GLUCAGON 1 MG/ML
1 KIT INJECTION PRN
Status: DISCONTINUED | OUTPATIENT
Start: 2024-10-08 | End: 2024-10-10 | Stop reason: HOSPADM

## 2024-10-08 RX ADMIN — ROCURONIUM BROMIDE 10 MG: 10 INJECTION, SOLUTION INTRAVENOUS at 12:43

## 2024-10-08 RX ADMIN — ROCURONIUM BROMIDE 10 MG: 10 INJECTION, SOLUTION INTRAVENOUS at 13:39

## 2024-10-08 RX ADMIN — ONDANSETRON 4 MG: 2 INJECTION INTRAMUSCULAR; INTRAVENOUS at 14:34

## 2024-10-08 RX ADMIN — DEXAMETHASONE SODIUM PHOSPHATE 4 MG: 4 INJECTION INTRA-ARTICULAR; INTRALESIONAL; INTRAMUSCULAR; INTRAVENOUS; SOFT TISSUE at 11:50

## 2024-10-08 RX ADMIN — FENTANYL CITRATE 50 MCG: 50 INJECTION INTRAMUSCULAR; INTRAVENOUS at 12:10

## 2024-10-08 RX ADMIN — FUROSEMIDE 10 MG: 10 INJECTION, SOLUTION INTRAMUSCULAR; INTRAVENOUS at 13:38

## 2024-10-08 RX ADMIN — FENTANYL CITRATE 50 MCG: 50 INJECTION INTRAMUSCULAR; INTRAVENOUS at 11:01

## 2024-10-08 RX ADMIN — MIDAZOLAM 2 MG: 1 INJECTION INTRAMUSCULAR; INTRAVENOUS at 10:54

## 2024-10-08 RX ADMIN — HYDROMORPHONE HYDROCHLORIDE 0.2 MG: 1 INJECTION, SOLUTION INTRAMUSCULAR; INTRAVENOUS; SUBCUTANEOUS at 13:46

## 2024-10-08 RX ADMIN — ROCURONIUM BROMIDE 20 MG: 10 INJECTION, SOLUTION INTRAVENOUS at 11:47

## 2024-10-08 RX ADMIN — FENTANYL CITRATE 50 MCG: 50 INJECTION INTRAMUSCULAR; INTRAVENOUS at 11:55

## 2024-10-08 RX ADMIN — OXYCODONE HYDROCHLORIDE 5 MG: 5 TABLET ORAL at 18:51

## 2024-10-08 RX ADMIN — SUGAMMADEX 200 MG: 100 INJECTION, SOLUTION INTRAVENOUS at 14:07

## 2024-10-08 RX ADMIN — LURASIDONE HYDROCHLORIDE 80 MG: 80 TABLET ORAL at 23:51

## 2024-10-08 RX ADMIN — LEVOFLOXACIN 500 MG: 5 INJECTION, SOLUTION INTRAVENOUS at 10:54

## 2024-10-08 RX ADMIN — FENTANYL CITRATE 50 MCG: 50 INJECTION INTRAMUSCULAR; INTRAVENOUS at 12:55

## 2024-10-08 RX ADMIN — ONDANSETRON 4 MG: 2 INJECTION INTRAMUSCULAR; INTRAVENOUS at 11:50

## 2024-10-08 RX ADMIN — LEVOFLOXACIN 500 MG: 5 INJECTION, SOLUTION INTRAVENOUS at 08:28

## 2024-10-08 RX ADMIN — ROCURONIUM BROMIDE 50 MG: 10 INJECTION, SOLUTION INTRAVENOUS at 11:01

## 2024-10-08 RX ADMIN — SODIUM CHLORIDE, POTASSIUM CHLORIDE, SODIUM LACTATE AND CALCIUM CHLORIDE: 600; 310; 30; 20 INJECTION, SOLUTION INTRAVENOUS at 13:16

## 2024-10-08 RX ADMIN — KETOROLAC TROMETHAMINE 30 MG: 30 INJECTION, SOLUTION INTRAMUSCULAR at 13:45

## 2024-10-08 RX ADMIN — LIDOCAINE HYDROCHLORIDE 100 MG: 20 INJECTION, SOLUTION EPIDURAL; INFILTRATION; INTRACAUDAL; PERINEURAL at 11:01

## 2024-10-08 RX ADMIN — Medication 100 MCG: at 11:51

## 2024-10-08 RX ADMIN — HYDROMORPHONE HYDROCHLORIDE 0.4 MG: 1 INJECTION, SOLUTION INTRAMUSCULAR; INTRAVENOUS; SUBCUTANEOUS at 14:03

## 2024-10-08 RX ADMIN — Medication 200 MCG: at 11:13

## 2024-10-08 RX ADMIN — METRONIDAZOLE 500 MG: 500 INJECTION, SOLUTION INTRAVENOUS at 11:30

## 2024-10-08 RX ADMIN — SODIUM CHLORIDE, POTASSIUM CHLORIDE, SODIUM LACTATE AND CALCIUM CHLORIDE: 600; 310; 30; 20 INJECTION, SOLUTION INTRAVENOUS at 08:23

## 2024-10-08 RX ADMIN — Medication 100 MCG: at 11:35

## 2024-10-08 RX ADMIN — ROCURONIUM BROMIDE 20 MG: 10 INJECTION, SOLUTION INTRAVENOUS at 12:56

## 2024-10-08 RX ADMIN — HYDROMORPHONE HYDROCHLORIDE 0.4 MG: 1 INJECTION, SOLUTION INTRAMUSCULAR; INTRAVENOUS; SUBCUTANEOUS at 13:04

## 2024-10-08 RX ADMIN — PROPOFOL 200 MG: 10 INJECTION, EMULSION INTRAVENOUS at 11:01

## 2024-10-08 RX ADMIN — SODIUM CHLORIDE: 4.5 INJECTION, SOLUTION INTRAVENOUS at 18:55

## 2024-10-08 RX ADMIN — ATROPA BELLADONNA AND OPIUM 60 MG: 16.2; 6 SUPPOSITORY RECTAL at 14:50

## 2024-10-08 ASSESSMENT — PAIN - FUNCTIONAL ASSESSMENT
PAIN_FUNCTIONAL_ASSESSMENT: ACTIVITIES ARE NOT PREVENTED
PAIN_FUNCTIONAL_ASSESSMENT: NONE - DENIES PAIN

## 2024-10-08 ASSESSMENT — PAIN SCALES - GENERAL
PAINLEVEL_OUTOF10: 5
PAINLEVEL_OUTOF10: 0

## 2024-10-08 ASSESSMENT — PAIN DESCRIPTION - DESCRIPTORS: DESCRIPTORS: ACHING

## 2024-10-08 ASSESSMENT — PAIN DESCRIPTION - LOCATION: LOCATION: BACK

## 2024-10-08 ASSESSMENT — LIFESTYLE VARIABLES
HOW MANY STANDARD DRINKS CONTAINING ALCOHOL DO YOU HAVE ON A TYPICAL DAY: PATIENT DOES NOT DRINK
HOW OFTEN DO YOU HAVE A DRINK CONTAINING ALCOHOL: NEVER

## 2024-10-08 ASSESSMENT — PAIN DESCRIPTION - ORIENTATION: ORIENTATION: MID;LOWER

## 2024-10-08 NOTE — ANESTHESIA POSTPROCEDURE EVALUATION
Department of Anesthesiology  Postprocedure Note    Patient: Yvonne Guo  MRN: [id number]  YOB: 1965  Date of evaluation: 10/8/2024    Procedure Summary       Date: 10/08/24 Room / Location: [facility] MAIN OR 08 / [facility] MAIN OR    Anesthesia Start: 1054 Anesthesia Stop: 1426    Procedure: RIGHT PERCUTANEOUS ULTRASONIC NEPHROLITHOTOMY CYSTOURETHROSCOPY URETEROSCOPY LITHOTRIPSY RIGHT URETERAL STENT PLACEMENT WITH ANTEGRADE PYELOGRAM (Right: Kidney) Diagnosis:       Renal stone      (Renal stone [N20.0])    Surgeons: Reed White MD Responsible Provider: Asif Brar MD    Anesthesia Type: General ASA Status: 2            Anesthesia Type: General    Bradley Phase I: Bradley Score: 10    Bradley Phase II:      Anesthesia Post Evaluation    Patient location during evaluation: PACU  Patient participation: complete - patient participated  Level of consciousness: sleepy but conscious  Pain score: 0  Airway patency: patent  Nausea & Vomiting: no nausea and no vomiting  Cardiovascular status: hemodynamically stable  Respiratory status: acceptable  Hydration status: stable  Multimodal analgesia pain management approach    No notable events documented.

## 2024-10-08 NOTE — DISCHARGE INSTRUCTIONS
URETEROSCOPY WITH LASER LITHOTRIPSY FOR THE TREATMENT OF KIDNEY STONES      Laser lithotripsy is a way to treat kidney stones. This treatment uses a laser to break kidney stones into tiny pieces.  There are no cuts or incisions made in your skin.  Your doctor performs the procedure by going through the urethra (tube that drains the urine from the bladder).      Once the stone is seen, a laser fiber is used to transmit energy that breaks up your kidney stone(s). The surgeon removes some pieces through the urethra with a small basket, and smaller pieces can be passed later with urination. The surgeon may also use a high-powered laser with high-frequency emissions that \"dust\" the stones into a fine powder. You can then pass the fine particles in your urine after surgery.           What You Need To Know About The Procedure:  Typically used for stones that have not been passed successfully and are lodged in the ureter (the tube that drains urine from the kidney to the bladder).  Uses the energy from a laser to break up the stone(s)  It is sometimes more successful than the ESWL procedure (shockwave therapy)  It creates minimal stone fragments to pass  Usually performed in an outpatient setting (no hospital stay)    What you may experience after the procedure:   Mild bladder irritation  Mild back or side (flank) pain with urination  Blood in urine  For several hours after the procedure you may have a burning feeling when you urinate. You may feel the urge to go even if you don't need to. This feeling should go away within a day    Ureteral stent:  Stents are temporary plastic tubes that are inserted into the ureter.  One end is in the kidney and the other in the bladder.  Their purpose is to keep the ureter open after treatment of a stone so that the kidney is able to drain urine.  Having the stent allows the edema (swelling) and inflammation present in the ureter to resolve after your treatment.  A stent is typically  UROLOGY  [phone]

## 2024-10-08 NOTE — PROGRESS NOTES
4 Eyes Skin Assessment     NAME:  Yvonne Guo  YOB: 1965  MEDICAL RECORD NUMBER:  [id number]    The patient is being assessed for  Admission    I agree that at least one RN has performed a thorough Head to Toe Skin Assessment on the patient. ALL assessment sites listed below have been assessed.      Areas assessed by both nurses:    Head, Face, Ears, Shoulders, Back, Chest, Arms, Elbows, Hands, Sacrum. Buttock, Coccyx, Ischium, Legs. Feet and Heels, and Under Medical Devices R nephrostomy tube        Does the Patient have a Wound? No noted wound(s)       Alberto Prevention initiated by RN: No  Wound Care Orders initiated by RN: No    Pressure Injury (Stage 3,4, Unstageable, DTI, NWPT, and Complex wounds) if present, place Wound referral order by RN under : No    New Ostomies, if present place, Ostomy referral order under : No     Nurse 1 eSignature: Electronically signed by Angelica Jernigan RN on 10/8/24 at 5:32 PM EDT    **SHARE this note so that the co-signing nurse can place an eSignature**    Nurse 2 eSignature: Electronically signed by Chelsea Juarez RN on 10/8/24 at 8:34 PM EDT

## 2024-10-08 NOTE — OP NOTE
Operative Note      Patient: Yvonne Guo  YOB: 1965  MRN: [id number]    Date of Procedure: 10/8/2024    Pre-Op Diagnosis Codes:      * Renal stone [N20.0]    Post-Op Diagnosis: Same       Procedure(s):  RIGHT PERCUTANEOUS ULTRASONIC NEPHROLITHOTOMY CYSTOURETHROSCOPY URETEROSCOPY LITHOTRIPSY RIGHT URETERAL STENT PLACEMENT WITH ANTEGRADE PYELOGRAM    Surgeon(s):  Reed White MD    Assistant:   * No surgical staff found *    Anesthesia: General    Estimated Blood Loss (mL): less than 100     Complications: None    Specimens:   ID Type Source Tests Collected by Time Destination   A : moses urine Urine Urine, indwelling catheter CULTURE, URINE Reed White MD 10/8/2024 1110        Implants:  * No implants in log *      Drains:   Nephrostomy Tube Right Flank 8.5 fr (Active)   Dressing Status Clean, dry & intact 10/07/24 1324   Dressing Type Dry dressing 10/07/24 1216   Collection Container Standard 10/07/24 1216   Securement Method Securing device (Describe) 10/07/24 1216   Status Draining;Patent 10/07/24 1324   Urine Color Yellow 10/07/24 1325   Urine Appearance Clear 10/07/24 1325   Output (mL) 50 mL 10/07/24 1325       Urinary Catheter 10/08/24 Moses (Active)       Findings:  Infection Present At Time Of Surgery (PATOS) (choose all levels that have infection present):  No infection present  Other Findings: large stone in lower pole of kidney    Detailed Description of Procedure:   Patient has a large stone in her right kidney.  She is set up for percutaneous nephrolithotripsy she is aware the risks of bleeding infection injury to kidney ureter vascular injury pulmonary embolus and death she is aware of alternatives she has no questions  Procedure patient prepped and draped usual sterile fashion after undergoing general anesthesia placed in prone position timeout was performed preoperative antibiotics were given SCDs were attached  I performed a nephrostogram first to be sure the  nephrostomy tube was in proper location in lower pole just next to the stone and it was a large stone.  So I passed a 0.35 Glidewire down the ureter through the nephrostomy tube and went all the way to the bladder under fluoroscopy.  I then removed the stitch around the nephrostomy tube removed the percutaneous nephrostomy tube and passed a dual-lumen catheter over the guidewire through the renal pelvis.  I passed a Amplatz wire through the second lumen the path was a little undulating.  So I dilated the past to the lower pole of the kidney through the second wire I placed in.  I left a primary wire as a safety wire and later exchanged it for a sensor wire.  I dilated the tract to a 28 Indonesian with a 30 Indonesian sheath placed towards the kidney.  There was a gap between the collecting system and the track.  So I passed a balloon dilator over the wire going down the ureter.  I dilated to 30 pounds of pressure.  Then replaced the dilators over the wire and was able to get access to the collecting system at the tip of the calyx through the wire tunnel.  I then placed in the nephroscope, rigid 1.  Using the suction hyper sonic instrument able to break the stone to large pieces and small pieces. I sucked out small pieces , and used a 3 prong grasper to remove several pieces over 8 mm in size .  Stone was over 2.2 cm fluoroscopy did not reveal any other large stones.  (I had seen the stone on fluoroscopy without contrast earlier so the stone is relatively radiopaque.)  I then placed a ureteral catheter over the safety wire down the ureter and stitched to the skin I put a 20 [place] Oconnell over the other wire into the renal pelvis confirmed its location both fluoroscopy insufflated the balloon to 3 cc.  Put some Floseal in between the 2 catheters going into the kidney.  Stitched both these catheters with a nylon suture.  Used quarter percent Marcaine block.  I then placed sterile dressings.  I irrigated nephrostomy tube

## 2024-10-09 ENCOUNTER — APPOINTMENT (OUTPATIENT)
Facility: HOSPITAL | Age: 59
End: 2024-10-09
Attending: UROLOGY
Payer: MEDICARE

## 2024-10-09 LAB
ANION GAP SERPL CALC-SCNC: 4 MMOL/L (ref 2–12)
BACTERIA SPEC CULT: NORMAL
BUN SERPL-MCNC: 8 MG/DL (ref 6–20)
BUN/CREAT SERPL: 10 (ref 12–20)
CA-I BLD-MCNC: 8.6 MG/DL (ref 8.5–10.1)
CHLORIDE SERPL-SCNC: 103 MMOL/L (ref 97–108)
CO2 SERPL-SCNC: 28 MMOL/L (ref 21–32)
CREAT SERPL-MCNC: 0.8 MG/DL (ref 0.55–1.02)
ERYTHROCYTE [DISTWIDTH] IN BLOOD BY AUTOMATED COUNT: 12.1 % (ref 11.5–14.5)
GLUCOSE BLD STRIP.AUTO-MCNC: 107 MG/DL (ref 65–100)
GLUCOSE BLD STRIP.AUTO-MCNC: 108 MG/DL (ref 65–100)
GLUCOSE BLD STRIP.AUTO-MCNC: 111 MG/DL (ref 65–100)
GLUCOSE BLD STRIP.AUTO-MCNC: 111 MG/DL (ref 65–100)
GLUCOSE BLD STRIP.AUTO-MCNC: 121 MG/DL (ref 65–100)
GLUCOSE BLD STRIP.AUTO-MCNC: 122 MG/DL (ref 65–100)
GLUCOSE SERPL-MCNC: 100 MG/DL (ref 65–100)
HCT VFR BLD AUTO: 29.9 % (ref 35–47)
HGB BLD-MCNC: 10 G/DL (ref 11.5–16)
Lab: NORMAL
MCH RBC QN AUTO: 29.5 PG (ref 26–34)
MCHC RBC AUTO-ENTMCNC: 33.4 G/DL (ref 30–36.5)
MCV RBC AUTO: 88.2 FL (ref 80–99)
NRBC # BLD: 0 K/UL (ref 0–0.01)
NRBC BLD-RTO: 0 PER 100 WBC
PERFORMED BY:: ABNORMAL
PLATELET # BLD AUTO: 322 K/UL (ref 150–400)
PMV BLD AUTO: 8.8 FL (ref 8.9–12.9)
POTASSIUM SERPL-SCNC: 3.5 MMOL/L (ref 3.5–5.1)
RBC # BLD AUTO: 3.39 M/UL (ref 3.8–5.2)
SODIUM SERPL-SCNC: 135 MMOL/L (ref 136–145)
WBC # BLD AUTO: 7.4 K/UL (ref 3.6–11)

## 2024-10-09 PROCEDURE — 6370000000 HC RX 637 (ALT 250 FOR IP): Performed by: NURSE PRACTITIONER

## 2024-10-09 PROCEDURE — 80048 BASIC METABOLIC PNL TOTAL CA: CPT

## 2024-10-09 PROCEDURE — 36415 COLL VENOUS BLD VENIPUNCTURE: CPT

## 2024-10-09 PROCEDURE — 85027 COMPLETE CBC AUTOMATED: CPT

## 2024-10-09 PROCEDURE — 97161 PT EVAL LOW COMPLEX 20 MIN: CPT

## 2024-10-09 PROCEDURE — 82962 GLUCOSE BLOOD TEST: CPT

## 2024-10-09 PROCEDURE — 74018 RADEX ABDOMEN 1 VIEW: CPT

## 2024-10-09 PROCEDURE — 97530 THERAPEUTIC ACTIVITIES: CPT

## 2024-10-09 RX ORDER — DOCUSATE SODIUM 100 MG/1
100 CAPSULE, LIQUID FILLED ORAL DAILY
Status: DISCONTINUED | OUTPATIENT
Start: 2024-10-09 | End: 2024-10-10 | Stop reason: HOSPADM

## 2024-10-09 RX ORDER — CIPROFLOXACIN 500 MG/1
250 TABLET, FILM COATED ORAL ONCE
Status: COMPLETED | OUTPATIENT
Start: 2024-10-09 | End: 2024-10-09

## 2024-10-09 RX ORDER — BISACODYL 10 MG
10 SUPPOSITORY, RECTAL RECTAL DAILY PRN
Status: DISCONTINUED | OUTPATIENT
Start: 2024-10-09 | End: 2024-10-10 | Stop reason: HOSPADM

## 2024-10-09 RX ORDER — OXYCODONE AND ACETAMINOPHEN 5; 325 MG/1; MG/1
1 TABLET ORAL EVERY 8 HOURS PRN
Qty: 9 TABLET | Refills: 0 | Status: SHIPPED | OUTPATIENT
Start: 2024-10-09 | End: 2024-10-12

## 2024-10-09 RX ORDER — CIPROFLOXACIN 250 MG/1
250 TABLET, FILM COATED ORAL 2 TIMES DAILY
Qty: 14 TABLET | Refills: 0 | Status: SHIPPED | OUTPATIENT
Start: 2024-10-09 | End: 2024-10-16

## 2024-10-09 RX ORDER — PSEUDOEPHEDRINE HCL 30 MG
100 TABLET ORAL DAILY
Qty: 30 CAPSULE | Refills: 0 | Status: SHIPPED | OUTPATIENT
Start: 2024-10-10

## 2024-10-09 RX ORDER — BISACODYL 10 MG
10 SUPPOSITORY, RECTAL RECTAL DAILY PRN
Qty: 30 SUPPOSITORY | Refills: 0 | Status: SHIPPED | OUTPATIENT
Start: 2024-10-09 | End: 2024-11-08

## 2024-10-09 RX ORDER — BISACODYL 10 MG
10 SUPPOSITORY, RECTAL RECTAL DAILY PRN
Qty: 5 SUPPOSITORY | Refills: 0 | OUTPATIENT
Start: 2024-10-09 | End: 2024-10-14

## 2024-10-09 RX ORDER — PSEUDOEPHEDRINE HCL 30 MG
100 TABLET ORAL DAILY
Qty: 10 CAPSULE | Refills: 0 | OUTPATIENT
Start: 2024-10-09 | End: 2024-10-19

## 2024-10-09 RX ADMIN — LURASIDONE HYDROCHLORIDE 80 MG: 80 TABLET ORAL at 09:08

## 2024-10-09 RX ADMIN — LEVOTHYROXINE SODIUM 25 MCG: 0.03 TABLET ORAL at 09:04

## 2024-10-09 RX ADMIN — OXYCODONE HYDROCHLORIDE 5 MG: 5 TABLET ORAL at 17:26

## 2024-10-09 RX ADMIN — PANTOPRAZOLE SODIUM 40 MG: 40 TABLET, DELAYED RELEASE ORAL at 06:57

## 2024-10-09 RX ADMIN — OXYCODONE HYDROCHLORIDE 5 MG: 5 TABLET ORAL at 21:36

## 2024-10-09 RX ADMIN — OXYCODONE HYDROCHLORIDE 5 MG: 5 TABLET ORAL at 09:04

## 2024-10-09 RX ADMIN — OXYCODONE HYDROCHLORIDE 5 MG: 5 TABLET ORAL at 00:05

## 2024-10-09 RX ADMIN — CIPROFLOXACIN HYDROCHLORIDE 250 MG: 500 TABLET, FILM COATED ORAL at 23:13

## 2024-10-09 RX ADMIN — OXYCODONE HYDROCHLORIDE 5 MG: 5 TABLET ORAL at 13:31

## 2024-10-09 RX ADMIN — OXYCODONE HYDROCHLORIDE 5 MG: 5 TABLET ORAL at 04:01

## 2024-10-09 RX ADMIN — ATORVASTATIN CALCIUM 20 MG: 20 TABLET, FILM COATED ORAL at 09:04

## 2024-10-09 ASSESSMENT — PAIN DESCRIPTION - ORIENTATION
ORIENTATION: LEFT
ORIENTATION: LEFT
ORIENTATION: POSTERIOR
ORIENTATION: LEFT

## 2024-10-09 ASSESSMENT — PAIN SCALES - GENERAL
PAINLEVEL_OUTOF10: 4
PAINLEVEL_OUTOF10: 6
PAINLEVEL_OUTOF10: 8
PAINLEVEL_OUTOF10: 5
PAINLEVEL_OUTOF10: 7
PAINLEVEL_OUTOF10: 8
PAINLEVEL_OUTOF10: 7
PAINLEVEL_OUTOF10: 7
PAINLEVEL_OUTOF10: 8
PAINLEVEL_OUTOF10: 5
PAINLEVEL_OUTOF10: 7
PAINLEVEL_OUTOF10: 6
PAINLEVEL_OUTOF10: 7

## 2024-10-09 ASSESSMENT — PAIN DESCRIPTION - LOCATION
LOCATION: FLANK
LOCATION: FLANK
LOCATION: BACK
LOCATION: FLANK
LOCATION: BACK

## 2024-10-09 ASSESSMENT — PAIN DESCRIPTION - DESCRIPTORS: DESCRIPTORS: SORE

## 2024-10-09 NOTE — PROGRESS NOTES
Patient stated she had other meds that were not on her MAR scheduled for tonight. Trazodone, Klonopin, Simvastatin, Oxycodone, Percocet, and Dulcolax. Perfectserve message sent to MD Christopher around 0000.    [facility] stated that pharmacy does not carry Linzess.    Patient upset with these med not being given because she stated \"it is apart of my psych treatment\". Education provided and reminded her to speak with MD in the morning regarding meds.

## 2024-10-09 NOTE — PLAN OF CARE
PHYSICAL THERAPY EVALUATION  Patient: Yvonne Guo (58 y.o. female)  Date: 10/9/2024  Primary Diagnosis: Renal stone [N20.0]  Calculus of kidney [N20.0]  Procedure(s) (LRB):  RIGHT PERCUTANEOUS ULTRASONIC NEPHROLITHOTOMY CYSTOURETHROSCOPY URETEROSCOPY LITHOTRIPSY RIGHT URETERAL STENT PLACEMENT WITH ANTEGRADE PYELOGRAM (Right) 1 Day Post-Op   Precautions: Restrictions/Precautions  Restrictions/Precautions: Fall Risk     Recommendations for nursing mobility: Out of bed to chair for meals, Encourage HEP in prep for ADLs/mobility; see handout for details, AD and gt belt for bed to chair , Amb to bathroom with AD and gait belt, Amb in hallway, and Assist x1    In place during session: Peripheral IV and Nephrostomy Tube    ASSESSMENT  Pt is a 58 y.o. female admitted on 10/8/2024 for open surgery ureteroscopy procedure; pt currently being treated for post op day #1 open surgery ureteroscopy  . Pt semi-supine upon PT arrival, agreeable to evaluation. Pt A&O x 4.  Patient underwent open surgery ureteroscopy on 10/8.     Based on the objective data described below, the patient currently presents with impaired functional mobility, decreased independence in ADLs, impaired ability to perform high-level IADLs, impaired strength, impaired balance, and increased pain levels. (See below for objective details and assist levels).     Overall pt tolerated session good today with report of 8/10 intermittent pain in the R flank at entry site for procedure. Pt required SBA x 1 for bed mobility and transfers. Pt amb 325 feet from EOB into hallway and around unit with RW, gt belt and SBA x 1 requiring additional time; demonstrates slow gait. Patient educated on therapuetic exercises in order to prevent muscle atrophy and blood clot formation. PT should plan to do stair training with patient at next session so that patient can safely return to home environment . Pt will benefit from continued skilled PT to address above deficits and

## 2024-10-09 NOTE — DISCHARGE SUMMARY
UROLOGY Progress Note         [phone]        PROVIDER DISCHARGE SUMMARY     Patient ID:    Yvonne Guo  [id number]  58 y.o.  1965    Admit date: 10/8/2024    Discharge date : 10/9/2024    Diagnoses: Renal stone [N20.0]  Calculus of kidney [N20.0]    Reason for Hospitalization:Kidney stones  Procedures: RIGHT PERCUTANEOUS ULTRASONIC NEPHROLITHOTOMY CYSTOURETHROSCOPY URETEROSCOPY LITHOTRIPSY RIGHT URETERAL STENT PLACEMENT WITH ANTEGRADE PYELOGRAM (Right: Kidney)       Hospital Course: Uncomplicated hospital course following surgery. Pain is well-controlled.  Patient is tolerating a diet.  Patient is ambulatory.    Labs are reviewed and felt to be satisfactory for discharge.  Incision sites are clean, dry and intact.    Patient can go home after successful voiding trial. She is to go home with  nephrostomy tube. May attach smaller bag to nephrostomy tube,discontinue urethral catheter. Keep nephrostomy tube open.    Follow up Care:    Your appointment with us is on: 10/15/2024    Discharge Medications:      Medication List        START taking these medications      bisacodyl 10 MG suppository  Commonly known as: DULCOLAX  Place 1 suppository rectally daily as needed for Constipation  Replaces: bisacodyl 5 MG EC tablet     ciprofloxacin 250 MG tablet  Commonly known as: CIPRO  Take 1 tablet by mouth 2 times daily for 7 days     docusate 100 MG Caps  Commonly known as: COLACE, DULCOLAX  Take 100 mg by mouth daily  Start taking on: October 10, 2024     oxyCODONE-acetaminophen 5-325 MG per tablet  Commonly known as: Percocet  Take 1 tablet by mouth every 8 hours as needed for Pain for up to 3 days. Intended supply: 3 days. Take lowest dose possible to manage pain Max Daily Amount: 3 tablets  Replaces: oxyCODONE-acetaminophen  MG per tablet            CONTINUE taking these medications      clonazePAM 0.5 MG tablet  Commonly known as: KLONOPIN     levothyroxine 25 MCG tablet  Commonly known as:  UP AS SCHEDULED.  NURSING STAFF TO PROVIDE EDUCATION MATERIALS AND DISCHARGE INSTRUCTION SET SPECIFIC TO THIS PATIENT AS REQUESTED BY ME.  POST-OPERATIVE COUNSELING PROVIDED BY ME PRIOR TO DISCHARGE.      Signed:  CHEIKH Hermosillo NP  10/9/2024  3:51 PM

## 2024-10-09 NOTE — PLAN OF CARE
Problem: Chronic Conditions and Co-morbidities  Goal: Patient's chronic conditions and co-morbidity symptoms are monitored and maintained or improved  10/9/2024 0137 by Jazmín Jeong RN  Outcome: Progressing  Flowsheets (Taken 10/8/2024 1953)  Care Plan - Patient's Chronic Conditions and Co-Morbidity Symptoms are Monitored and Maintained or Improved: Monitor and assess patient's chronic conditions and comorbid symptoms for stability, deterioration, or improvement  10/8/2024 1528 by Angelica Jernigan RN  Outcome: Progressing     Problem: Safety - Adult  Goal: Free from fall injury  10/9/2024 0137 by Jazmín Jeong RN  Outcome: Progressing  10/8/2024 1528 by Angelica Jernigan RN  Outcome: Progressing     Problem: ABCDS Injury Assessment  Goal: Absence of physical injury  10/9/2024 0137 by Jazmín Jeong RN  Outcome: Progressing  10/8/2024 1528 by Angelica Jernigan RN  Outcome: Progressing     Problem: Discharge Planning  Goal: Discharge to home or other facility with appropriate resources  10/9/2024 0137 by Jazmín Jeong RN  Outcome: Progressing  Flowsheets (Taken 10/8/2024 1953)  Discharge to home or other facility with appropriate resources: Identify barriers to discharge with patient and caregiver  10/8/2024 1528 by Angelica Jernigan RN  Outcome: Progressing     Problem: Skin/Tissue Integrity  Goal: Absence of new skin breakdown  Description: 1.  Monitor for areas of redness and/or skin breakdown  2.  Assess vascular access sites hourly  3.  Every 4-6 hours minimum:  Change oxygen saturation probe site  4.  Every 4-6 hours:  If on nasal continuous positive airway pressure, respiratory therapy assess nares and determine need for appliance change or resting period.  Outcome: Progressing     Problem: Pain  Goal: Verbalizes/displays adequate comfort level or baseline comfort level  Outcome: Progressing  Flowsheets (Taken 10/8/2024 1953)  Verbalizes/displays adequate comfort level or baseline comfort level: Assess pain using

## 2024-10-09 NOTE — CARE COORDINATION
AUGUSTINE previewed chart. Patient POD 1, in outpatient status.    Current discharge plan is home once medically stable.

## 2024-10-09 NOTE — PROGRESS NOTES
Dressing to back saturated.  Dr White made aware.  Orders received to reinforce and continue to monitor.  Reinforced dressing has bleeding noted.  Oconnell output  is 350 of bloody urine.  Drainage output of 200 ml of bloody drainage.   Dr White made aware.

## 2024-10-10 VITALS
HEART RATE: 81 BPM | HEIGHT: 65 IN | SYSTOLIC BLOOD PRESSURE: 124 MMHG | RESPIRATION RATE: 16 BRPM | TEMPERATURE: 99.8 F | WEIGHT: 148 LBS | BODY MASS INDEX: 24.66 KG/M2 | OXYGEN SATURATION: 97 % | DIASTOLIC BLOOD PRESSURE: 85 MMHG

## 2024-10-10 LAB
ALBUMIN SERPL-MCNC: 3.2 G/DL (ref 3.5–5)
ALBUMIN/GLOB SERPL: 0.8 (ref 1.1–2.2)
ALP SERPL-CCNC: 64 U/L (ref 45–117)
ALT SERPL-CCNC: 14 U/L (ref 12–78)
ANION GAP SERPL CALC-SCNC: 4 MMOL/L (ref 2–12)
AST SERPL W P-5'-P-CCNC: 11 U/L (ref 15–37)
BILIRUB SERPL-MCNC: 0.4 MG/DL (ref 0.2–1)
BUN SERPL-MCNC: 7 MG/DL (ref 6–20)
BUN/CREAT SERPL: 9 (ref 12–20)
CA-I BLD-MCNC: 9.3 MG/DL (ref 8.5–10.1)
CHLORIDE SERPL-SCNC: 102 MMOL/L (ref 97–108)
CO2 SERPL-SCNC: 28 MMOL/L (ref 21–32)
CREAT SERPL-MCNC: 0.8 MG/DL (ref 0.55–1.02)
ERYTHROCYTE [DISTWIDTH] IN BLOOD BY AUTOMATED COUNT: 12.4 % (ref 11.5–14.5)
GLOBULIN SER CALC-MCNC: 3.9 G/DL (ref 2–4)
GLUCOSE BLD STRIP.AUTO-MCNC: 104 MG/DL (ref 65–100)
GLUCOSE BLD STRIP.AUTO-MCNC: 114 MG/DL (ref 65–100)
GLUCOSE BLD STRIP.AUTO-MCNC: 116 MG/DL (ref 65–100)
GLUCOSE SERPL-MCNC: 113 MG/DL (ref 65–100)
HCT VFR BLD AUTO: 30.7 % (ref 35–47)
HGB BLD-MCNC: 10.2 G/DL (ref 11.5–16)
MCH RBC QN AUTO: 29.4 PG (ref 26–34)
MCHC RBC AUTO-ENTMCNC: 33.2 G/DL (ref 30–36.5)
MCV RBC AUTO: 88.5 FL (ref 80–99)
NRBC # BLD: 0 K/UL (ref 0–0.01)
NRBC BLD-RTO: 0 PER 100 WBC
PERFORMED BY:: ABNORMAL
PLATELET # BLD AUTO: 350 K/UL (ref 150–400)
PMV BLD AUTO: 8.9 FL (ref 8.9–12.9)
POTASSIUM SERPL-SCNC: 4 MMOL/L (ref 3.5–5.1)
PROT SERPL-MCNC: 7.1 G/DL (ref 6.4–8.2)
RBC # BLD AUTO: 3.47 M/UL (ref 3.8–5.2)
SODIUM SERPL-SCNC: 134 MMOL/L (ref 136–145)
WBC # BLD AUTO: 6.8 K/UL (ref 3.6–11)

## 2024-10-10 PROCEDURE — 80053 COMPREHEN METABOLIC PANEL: CPT

## 2024-10-10 PROCEDURE — 99239 HOSP IP/OBS DSCHRG MGMT >30: CPT | Performed by: UROLOGY

## 2024-10-10 PROCEDURE — 97530 THERAPEUTIC ACTIVITIES: CPT

## 2024-10-10 PROCEDURE — 36415 COLL VENOUS BLD VENIPUNCTURE: CPT

## 2024-10-10 PROCEDURE — 85027 COMPLETE CBC AUTOMATED: CPT

## 2024-10-10 PROCEDURE — 82962 GLUCOSE BLOOD TEST: CPT

## 2024-10-10 PROCEDURE — 6370000000 HC RX 637 (ALT 250 FOR IP): Performed by: NURSE PRACTITIONER

## 2024-10-10 RX ADMIN — LURASIDONE HYDROCHLORIDE 80 MG: 80 TABLET ORAL at 10:11

## 2024-10-10 RX ADMIN — OXYCODONE HYDROCHLORIDE 5 MG: 5 TABLET ORAL at 10:01

## 2024-10-10 RX ADMIN — OXYCODONE HYDROCHLORIDE 5 MG: 5 TABLET ORAL at 05:28

## 2024-10-10 RX ADMIN — OXYCODONE HYDROCHLORIDE 5 MG: 5 TABLET ORAL at 01:53

## 2024-10-10 RX ADMIN — ATORVASTATIN CALCIUM 20 MG: 20 TABLET, FILM COATED ORAL at 10:01

## 2024-10-10 RX ADMIN — DOCUSATE SODIUM 100 MG: 100 CAPSULE, LIQUID FILLED ORAL at 10:01

## 2024-10-10 RX ADMIN — PANTOPRAZOLE SODIUM 40 MG: 40 TABLET, DELAYED RELEASE ORAL at 05:28

## 2024-10-10 RX ADMIN — LEVOTHYROXINE SODIUM 25 MCG: 0.03 TABLET ORAL at 10:01

## 2024-10-10 ASSESSMENT — PAIN SCALES - GENERAL
PAINLEVEL_OUTOF10: 5
PAINLEVEL_OUTOF10: 5
PAINLEVEL_OUTOF10: 7
PAINLEVEL_OUTOF10: 8
PAINLEVEL_OUTOF10: 7
PAINLEVEL_OUTOF10: 8

## 2024-10-10 ASSESSMENT — PAIN DESCRIPTION - DESCRIPTORS
DESCRIPTORS: ACHING;DULL;SHARP
DESCRIPTORS: SORE
DESCRIPTORS: ACHING

## 2024-10-10 ASSESSMENT — PAIN DESCRIPTION - LOCATION
LOCATION: BACK
LOCATION: ABDOMEN

## 2024-10-10 ASSESSMENT — PAIN DESCRIPTION - ORIENTATION
ORIENTATION: POSTERIOR
ORIENTATION: RIGHT

## 2024-10-10 ASSESSMENT — PAIN SCALES - WONG BAKER: WONGBAKER_NUMERICALRESPONSE: NO HURT

## 2024-10-10 NOTE — PLAN OF CARE
Problem: Chronic Conditions and Co-morbidities  Goal: Patient's chronic conditions and co-morbidity symptoms are monitored and maintained or improved  Outcome: Progressing  Flowsheets (Taken 10/9/2024 2000)  Care Plan - Patient's Chronic Conditions and Co-Morbidity Symptoms are Monitored and Maintained or Improved: Monitor and assess patient's chronic conditions and comorbid symptoms for stability, deterioration, or improvement     Problem: Safety - Adult  Goal: Free from fall injury  Outcome: Progressing     Problem: ABCDS Injury Assessment  Goal: Absence of physical injury  Outcome: Progressing     Problem: Discharge Planning  Goal: Discharge to home or other facility with appropriate resources  Outcome: Progressing  Flowsheets (Taken 10/9/2024 2000)  Discharge to home or other facility with appropriate resources: Identify barriers to discharge with patient and caregiver     Problem: Skin/Tissue Integrity  Goal: Absence of new skin breakdown  Description: 1.  Monitor for areas of redness and/or skin breakdown  2.  Assess vascular access sites hourly  3.  Every 4-6 hours minimum:  Change oxygen saturation probe site  4.  Every 4-6 hours:  If on nasal continuous positive airway pressure, respiratory therapy assess nares and determine need for appliance change or resting period.  Outcome: Progressing     Problem: Pain  Goal: Verbalizes/displays adequate comfort level or baseline comfort level  Outcome: Progressing  Flowsheets (Taken 10/9/2024 2000)  Verbalizes/displays adequate comfort level or baseline comfort level: Assess pain using appropriate pain scale     Problem: Physical Therapy - Adult  Goal: By Discharge: Performs mobility at highest level of function for planned discharge setting.  See evaluation for individualized goals.  Description: FUNCTIONAL STATUS PRIOR TO ADMISSION: Patient was independent and active without use of DME.    HOME SUPPORT PRIOR TO ADMISSION: The patient lived with family but did not

## 2024-10-10 NOTE — PLAN OF CARE
PHYSICAL THERAPY TREATMENT     Patient: Yvonne Guo (58 y.o. female)  Date: 10/10/2024  Diagnosis: Renal stone [N20.0]  Calculus of kidney [N20.0] Renal stone  Procedure(s) (LRB):  RIGHT PERCUTANEOUS ULTRASONIC NEPHROLITHOTOMY CYSTOURETHROSCOPY URETEROSCOPY LITHOTRIPSY RIGHT URETERAL STENT PLACEMENT WITH ANTEGRADE PYELOGRAM (Right) 2 Days Post-Op  Precautions: Fall Risk                      Recommendations for nursing mobility: Out of bed to chair for meals, Amb in hallway, and Assist x1    In place during session: Oconnell Catheter  Chart, physical therapy assessment, plan of care and goals were reviewed.  ASSESSMENT  Patient continues with skilled PT services and is progressing towards goals. Pt supine in bed upon PT arrival, agreeable to session. (See below for objective details and assist levels).     Overall pt tolerated session well today. Pt ambulated ~250 ft with RW sba. Gait was slow and steady with no lob or knee buckling noted. Pt performed 8 steps (ascending and descending) holding onto bilateral hand rails with sba. Pt returned supine in bed with all needs met. Will continue to benefit from skilled PT services, and will continue to progress as tolerated. Current PT DC recommendation Intermittent physical therapy up to 2-3x/week in previous living setting once medically appropriate.    GOALS:  Problem: Physical Therapy - Adult  Goal: By Discharge: Performs mobility at highest level of function for planned discharge setting.  See evaluation for individualized goals.  Description: FUNCTIONAL STATUS PRIOR TO ADMISSION: Patient was independent and active without use of DME.    HOME SUPPORT PRIOR TO ADMISSION: The patient lived with family but did not require assistance.    Physical Therapy Goals  Initiated 10/9/2024  Pt stated goal: I want to get up the stairs  Pt will be I with [facility] in 7 days.  Pt will perform bed mobility with Modified [place] in 7 days.  Pt will perform transfers with  Modified [place] in 7 days.   Pt will amb 400-425 feet with LRAD safely with Modified [place] in 7 days.   Pt will ascend/descend 13-15 steps with bilateral handrail(s) and Modified [place] in 7 days to safely enter/navigate home.   Pt will demonstrate improvement in static and dynamic standing balance from Stand by Assist to Modified [place] in 7 days.     Outcome: Progressing     PLAN :  Patient continues to benefit from skilled intervention to address functional impairments. Continue treatment per established plan of care to address goals.    Recommendation for discharge: (in order for the patient to meet his/her long term goals)  Intermittent physical therapy up to 2-3x/week in previous living setting    Potential barriers for safe discharge:.    IF patient discharges home will need the following DME:continuing to assess with progress     SUBJECTIVE:   Patient stated “I'm hurting.”    OBJECTIVE DATA SUMMARY:   Critical Behavior:  Orientation  Overall Orientation Status: Within Normal Limits  Orientation Level: Oriented X4  Cognition  Overall Cognitive Status: WNL  Functional Mobility Training:  Bed Mobility:  Bed Mobility Training  Bed Mobility Training: Yes  Rolling: Supervision  Supine to Sit: Supervision  Sit to Supine: Supervision  Scooting: Supervision  Transfers:  Transfer Training  Transfer Training: Yes  Sit to Stand: Stand-by assistance  Stand to Sit: Stand-by assistance  Balance:  Balance  Sitting: Intact  Standing: Intact  Wheelchair Mobility:  Wheelchair Management  Wheelchair Management: No  Ambulation/Gait Training:  Gait  Gait Training: Yes  Overall Level of Assistance: Stand-by assistance  Distance (ft): 250 Feet  Assistive Device: Walker, rolling  Gait Abnormalities: Decreased step clearance  Rail Use: Both  Number of Stairs Trained: 8  Uneven Terrain - Level of Assistance: Stand-by assistance     Pain Ratin/10 reported    Activity Tolerance:   Good    After treatment

## 2024-10-10 NOTE — DISCHARGE SUMMARY
UROLOGY Progress Note         [phone]        PROVIDER DISCHARGE SUMMARY     Patient ID:    Yvonne Guo  [id number]  58 y.o.  1965    Admit date: 10/8/2024    Discharge date : 10/10/2024    Diagnoses: Renal stone [N20.0]  Calculus of kidney [N20.0]    Reason for Hospitalization: renal stone     Procedures:  RIGHT PERCUTANEOUS ULTRASONIC NEPHROLITHOTOMY CYSTOURETHROSCOPY URETEROSCOPY LITHOTRIPSY RIGHT URETERAL STENT PLACEMENT WITH ANTEGRADE PYELOGRAM (Right: Kidney)             Hospital Course: Uncomplicated hospital course following surgery. Pain is well-controlled.  Patient is tolerating a diet.  Patient is ambulatory.    Labs are reviewed and felt to be satisfactory for discharge.  Incision sites are clean, dry and intact.  Please  attach smaller bag to urethral catheter on the left side. Please give  patient 4x4 to change dressing daily.    Follow up Care:    Your appointment with us is on: 10/15/2024  Discharge plan will be to remove the ureteral catheter performed a nephrostogram remove the nephrostomy tube if everything is clear.  KUB yesterday did not show a calcification  Discharge Medications:      Medication List        START taking these medications      bisacodyl 10 MG suppository  Commonly known as: DULCOLAX  Place 1 suppository rectally daily as needed for Constipation  Replaces: bisacodyl 5 MG EC tablet     ciprofloxacin 250 MG tablet  Commonly known as: CIPRO  Take 1 tablet by mouth 2 times daily for 7 days     docusate 100 MG Caps  Commonly known as: COLACE, DULCOLAX  Take 100 mg by mouth daily     oxyCODONE-acetaminophen 5-325 MG per tablet  Commonly known as: Percocet  Take 1 tablet by mouth every 8 hours as needed for Pain for up to 3 days. Intended supply: 3 days. Take lowest dose possible to manage pain Max Daily Amount: 3 tablets  Replaces: oxyCODONE-acetaminophen  MG per tablet            CONTINUE taking these medications      clonazePAM 0.5 MG tablet  Commonly known

## 2024-10-10 NOTE — CARE COORDINATION
Patient discharging home today with home health. Home health through [facility] health.    Transition of Care Plan:    RUR: outpatient  Prior Level of Functioning: ind  Disposition: home  If SNF or IPR: Date FOC offered: na  Date FOC received: na  Accepting facility: na  Date authorization started with reference number: na  Date authorization received and expires: na  Follow up appointments:   DME needed:   Transportation at discharge: family/friend  IM/IMM Medicare/ letter given: na  Is patient a [place] and connected with [facility]? na   If yes, was [place] transfer form completed and [facility] notified? na  Caregiver Contact: na  Discharge Caregiver contacted prior to discharge? na  Care Conference needed? na  Barriers to discharge: na

## 2024-10-11 ENCOUNTER — TELEPHONE (OUTPATIENT)
Age: 59
End: 2024-10-11

## 2024-10-11 DIAGNOSIS — N20.0 CALCULUS OF KIDNEY: ICD-10-CM

## 2024-10-11 NOTE — TELEPHONE ENCOUNTER
As per conversation with pharmacist, the patient got about 100 tabs of percocet from a different provider.

## 2024-10-11 NOTE — TELEPHONE ENCOUNTER
Pt LVM Stating That [facility] Need More Info From Us In Order For Her To Get Her oxyCODONE-acetaminophen (PERCOCET) 5-325 MG per tablet . Do You Mind Following Up On This Please.

## 2024-10-15 ENCOUNTER — TELEPHONE (OUTPATIENT)
Age: 59
End: 2024-10-15

## 2024-10-15 ENCOUNTER — OFFICE VISIT (OUTPATIENT)
Age: 59
End: 2024-10-15
Payer: MEDICARE

## 2024-10-15 VITALS — SYSTOLIC BLOOD PRESSURE: 156 MMHG | HEART RATE: 99 BPM | DIASTOLIC BLOOD PRESSURE: 95 MMHG

## 2024-10-15 DIAGNOSIS — R82.81 PYURIA: ICD-10-CM

## 2024-10-15 DIAGNOSIS — N20.0 RENAL STONE: ICD-10-CM

## 2024-10-15 DIAGNOSIS — N20.0 CALCULUS OF KIDNEY: Primary | ICD-10-CM

## 2024-10-15 LAB
BILIRUBIN, URINE, POC: NEGATIVE
BLOOD URINE, POC: ABNORMAL
GLUCOSE URINE, POC: NEGATIVE
KETONES, URINE, POC: NEGATIVE
LEUKOCYTE ESTERASE, URINE, POC: ABNORMAL
NITRITE, URINE, POC: NEGATIVE
PH, URINE, POC: 6.5 (ref 4.6–8)
PROTEIN,URINE, POC: 30
SPECIFIC GRAVITY, URINE, POC: 1.01 (ref 1–1.03)
URINALYSIS CLARITY, POC: CLEAR
URINALYSIS COLOR, POC: YELLOW
UROBILINOGEN, POC: ABNORMAL

## 2024-10-15 PROCEDURE — 81003 URINALYSIS AUTO W/O SCOPE: CPT | Performed by: UROLOGY

## 2024-10-15 PROCEDURE — 99024 POSTOP FOLLOW-UP VISIT: CPT | Performed by: UROLOGY

## 2024-10-15 NOTE — PROGRESS NOTES
HISTORY OF PRESENT ILLNESS  Yvonne Guo is a 59 y.o. female   Patient returns today removed.  Ureteral catheter clamped.  Nephrostomy tube bring her back in Thursday remove her nephrostomy tube if she is does well.  She denies fevers chills nausea vomiting urine has some white cells which she would expect.  1. Calculus of kidney  Overview:  RIGHT PERCUTANEOUS ULTRASONIC NEPHROLITHOTOMY CYSTOURETHROSCOPY URETEROSCOPY LITHOTRIPSY RIGHT URETERAL STENT PLACEMENT WITH ANTEGRADE PYELOGRAM (Right: Kidney)      2. Renal stone  Overview:  Us on 8/2024 reveled 16 mm nonobstructive right renal stone.    Ct scan on 9/2024 Large stone right lower pole collecting system. Smaller bilateral nonobstructing  renal stones. No ureteral stone or hydronephrosis  Urine culture negative for growth    She is s/p RIGHT PERCUTANEOUS ULTRASONIC NEPHROLITHOTOMY CYSTOURETHROSCOPY URETEROSCOPY LITHOTRIPSY RIGHT URETERAL STENT PLACEMENT WITH ANTEGRADE PYELOGRAM (Right: Kidney) on 10/8/2024  Findings: large stone in lower pole of kidney              PAST MEDICAL HISTORY  PMHx (including negatives):  has a past medical history of Bipolar 1 disorder (HCC), Diabetes mellitus (HCC), High cholesterol, Kidney stone, and Thyroid disease.   PSurgHx:  has a past surgical history that includes Cholecystectomy; Nasal septum surgery (Bilateral); lymph node biopsy; Appendectomy; IR GUIDED NEPHROSTOMY CATH PLACEMENT RIGHT (10/7/2024); and Kidney stone removal (Right, 10/8/2024).  PSocHx:  reports that she has never smoked. She has never used smokeless tobacco. She reports current drug use. Drug: Marijuana (Weed). She reports that she does not drink alcohol.   FamilyHX:   Family History   Problem Relation Age of Onset    Kidney stones Father     Alzheimer's Disease Father     Kidney stones Mother     Pulmonary Embolism Mother        ROS  Review of Systems      PHYSICAL EXAM  Physical Exam    ASSESSMENT and PLAN  1. Calculus of kidney  2. Renal

## 2024-10-15 NOTE — PROGRESS NOTES
Chief Complaint   Patient presents with    Post-Op Check        BP (!) 156/95   Pulse 99      PHQ-9 score is    Negative      1. \"Have you been to the ER, urgent care clinic since your last visit?  Hospitalized since your last visit?\" No    2. \"Have you seen or consulted any other health care providers outside of the [facility] since your last visit?\" No     3. For patients aged 45-75: Has the patient had a colonoscopy / FIT/ Cologuard? Yes - no Care Gap present      If the patient is female:    4. For patients aged 40-74: Has the patient had a mammogram within the past 2 years? Yes - no Care Gap present      5. For patients aged 21-65: Has the patient had a pap smear? Yes - no Care Gap present

## 2024-10-17 ENCOUNTER — OFFICE VISIT (OUTPATIENT)
Age: 59
End: 2024-10-17
Payer: MEDICARE

## 2024-10-17 VITALS — SYSTOLIC BLOOD PRESSURE: 140 MMHG | HEART RATE: 98 BPM | DIASTOLIC BLOOD PRESSURE: 93 MMHG

## 2024-10-17 DIAGNOSIS — N10 ACUTE PYELONEPHRITIS: ICD-10-CM

## 2024-10-17 DIAGNOSIS — N20.0 RENAL STONE: Primary | ICD-10-CM

## 2024-10-17 LAB
2,8 DIHYDROXYADENINE: NORMAL
AMM URATE MFR STONE: NORMAL %
BILIRUBIN, STONE: NORMAL
BILIRUBIN, URINE, POC: NEGATIVE
BLD.DRIED MFR STONE: NORMAL %
BLOOD URINE, POC: NORMAL
CA BILIRUB MFR STONE: NORMAL %
CA CARBONATE MFR STONE: NORMAL %
CA H2 PHOS DIHYD MFR STONE: NORMAL %
CA PHOS MFR STONE: NORMAL %
CALCIUM OXALATE DIHYDRATE MFR STONE IR: 10 %
CALCIUM PALMITATE: NORMAL
CALCIUM STEARATE: NORMAL
CARBONATE APATITE: NORMAL %
CELL MATERIAL STONE EST-MCNT: NORMAL %
CHOLEST MFR STONE: NORMAL %
COLOR STONE: NORMAL
COM MFR STONE: 70 %
CYSTINE MFR STONE: NORMAL %
DRUG OR METABOLITE: NORMAL
GLUCOSE URINE, POC: NEGATIVE
HYDROXYAPATITE: 20 %
KETONES, URINE, POC: NEGATIVE
LABORATORY COMMENT REPORT: NORMAL
LABORATORY COMMENT REPORT: NORMAL
LEUKOCYTE ESTERASE, URINE, POC: NORMAL
Lab: NORMAL
Lab: NORMAL
NA URATE MFR STONE IR: NORMAL %
NEWBERYITE MFR STONE: NORMAL %
NIDUS STONE QL: NORMAL
NITRITE, URINE, POC: NEGATIVE
OTHER COMPONENT(S): NORMAL
PH, URINE, POC: 7 (ref 4.6–8)
PHOTO: NORMAL
PROTEIN,URINE, POC: NEGATIVE
SHELL: NORMAL
SIZE STONE: NORMAL MM
SPECIFIC GRAVITY, URINE, POC: 1.01 (ref 1–1.03)
SPECIMEN SOURCE: NORMAL
STONE ANALYSIS-IMP: NORMAL
STONE COMPOSITION: NORMAL
SURFACE CRYSTALS: NORMAL
TRI-PHOS MFR STONE: NORMAL %
TRIAMTERENE MFR STONE: NORMAL %
URATE DIHYD MFR STONE: NORMAL %
URATE MFR STONE: NORMAL %
URINALYSIS CLARITY, POC: CLEAR
URINALYSIS COLOR, POC: YELLOW
UROBILINOGEN, POC: NORMAL
WT STONE: 1444 MG
XANTHINE, STONE: NORMAL

## 2024-10-17 PROCEDURE — G8484 FLU IMMUNIZE NO ADMIN: HCPCS | Performed by: UROLOGY

## 2024-10-17 PROCEDURE — 99214 OFFICE O/P EST MOD 30 MIN: CPT | Performed by: UROLOGY

## 2024-10-17 PROCEDURE — 3017F COLORECTAL CA SCREEN DOC REV: CPT | Performed by: UROLOGY

## 2024-10-17 PROCEDURE — G8420 CALC BMI NORM PARAMETERS: HCPCS | Performed by: UROLOGY

## 2024-10-17 PROCEDURE — 1036F TOBACCO NON-USER: CPT | Performed by: UROLOGY

## 2024-10-17 PROCEDURE — G8427 DOCREV CUR MEDS BY ELIG CLIN: HCPCS | Performed by: UROLOGY

## 2024-10-17 PROCEDURE — 81003 URINALYSIS AUTO W/O SCOPE: CPT | Performed by: UROLOGY

## 2024-10-17 RX ORDER — DOXYCYCLINE HYCLATE 100 MG
100 TABLET ORAL 2 TIMES DAILY
Qty: 20 TABLET | Refills: 0 | Status: SHIPPED | OUTPATIENT
Start: 2024-10-17 | End: 2024-10-27

## 2024-10-17 NOTE — PROGRESS NOTES
Chief Complaint   Patient presents with    Follow-up        BP (!) 140/93   Pulse 98      PHQ-9 score is    Negative      1. \"Have you been to the ER, urgent care clinic since your last visit?  Hospitalized since your last visit?\" No    2. \"Have you seen or consulted any other health care providers outside of the [facility] since your last visit?\" No     3. For patients aged 45-75: Has the patient had a colonoscopy / FIT/ Cologuard? Yes - no Care Gap present      If the patient is female:    4. For patients aged 40-74: Has the patient had a mammogram within the past 2 years? Yes - no Care Gap present      5. For patients aged 21-65: Has the patient had a pap smear? Yes - no Care Gap present

## 2024-10-17 NOTE — PROGRESS NOTES
HISTORY OF PRESENT ILLNESS  Yvonne Guo is a 59 y.o. female   Patient returns today to go to nephrostomy tube if the urine is clear.  She has a fair amount of pus cells in her urine we will send for culture we will treat her for pyelonephritis with some doxycycline given that it is from the nephrostomy tube and a high likelihood of a staph or strep.  See her back in 2 weeks recheck her urine and get a KUB at that time  1. Renal stone  Overview:  Us on 8/2024 reveled 16 mm nonobstructive right renal stone.    Ct scan on 9/2024 Large stone right lower pole collecting system. Smaller bilateral nonobstructing  renal stones. No ureteral stone or hydronephrosis  Urine culture negative for growth    She is s/p RIGHT PERCUTANEOUS ULTRASONIC NEPHROLITHOTOMY CYSTOURETHROSCOPY URETEROSCOPY LITHOTRIPSY RIGHT URETERAL STENT PLACEMENT WITH ANTEGRADE PYELOGRAM (Right: Kidney) on 10/8/2024  Findings: large stone in lower pole of kidney     10/15/2024 Ureteral catheter was removed, nephrectomy tube was clamped.         Orders:  -     AMB POC URINALYSIS DIP STICK AUTO W/O MICRO  -     Urinalysis with Microscopic  -     Culture, Urine  -     XR ABDOMEN (KUB) (SINGLE AP VIEW)  2. Acute pyelonephritis  -     doxycycline hyclate (VIBRA-TABS) 100 MG tablet; Take 1 tablet by mouth 2 times daily for 10 days, Disp-20 tablet, R-0Normal        PAST MEDICAL HISTORY  PMHx (including negatives):  has a past medical history of Bipolar 1 disorder (HCC), Diabetes mellitus (HCC), High cholesterol, Kidney stone, and Thyroid disease.   PSurgHx:  has a past surgical history that includes Cholecystectomy; Nasal septum surgery (Bilateral); lymph node biopsy; Appendectomy; IR GUIDED NEPHROSTOMY CATH PLACEMENT RIGHT (10/7/2024); and Kidney stone removal (Right, 10/8/2024).  PSocHx:  reports that she has never smoked. She has never used smokeless tobacco. She reports current drug use. Drug: Marijuana (Weed). She reports that she does not drink alcohol.

## 2024-10-18 LAB
APPEARANCE UR: CLEAR
BACTERIA #/AREA URNS HPF: ABNORMAL /[HPF]
BILIRUB UR QL STRIP: NEGATIVE
CASTS URNS QL MICRO: ABNORMAL /LPF
COLOR UR: YELLOW
EPI CELLS #/AREA URNS HPF: ABNORMAL /HPF (ref 0–10)
GLUCOSE UR QL STRIP: NEGATIVE
HGB UR QL STRIP: ABNORMAL
KETONES UR QL STRIP: NEGATIVE
LEUKOCYTE ESTERASE UR QL STRIP: ABNORMAL
MICRO URNS: ABNORMAL
NITRITE UR QL STRIP: NEGATIVE
PH UR STRIP: 6.5 [PH] (ref 5–7.5)
PROT UR QL STRIP: NEGATIVE
RBC #/AREA URNS HPF: ABNORMAL /HPF (ref 0–2)
SP GR UR STRIP: 1.01 (ref 1–1.03)
UROBILINOGEN UR STRIP-MCNC: 0.2 MG/DL (ref 0.2–1)
WBC #/AREA URNS HPF: ABNORMAL /HPF (ref 0–5)

## 2024-10-19 LAB — BACTERIA UR CULT: NORMAL

## 2024-10-25 ENCOUNTER — TELEPHONE (OUTPATIENT)
Age: 59
End: 2024-10-25

## 2024-10-29 ENCOUNTER — HOSPITAL ENCOUNTER (OUTPATIENT)
Facility: HOSPITAL | Age: 59
Discharge: HOME | End: 2024-11-01
Payer: MEDICARE

## 2024-10-29 DIAGNOSIS — N20.0 RENAL STONE: ICD-10-CM

## 2024-10-29 PROCEDURE — 74018 RADEX ABDOMEN 1 VIEW: CPT

## 2024-10-30 ENCOUNTER — TELEPHONE (OUTPATIENT)
Age: 59
End: 2024-10-30

## 2024-10-31 ENCOUNTER — OFFICE VISIT (OUTPATIENT)
Age: 59
End: 2024-10-31
Payer: MEDICARE

## 2024-10-31 ENCOUNTER — TELEPHONE (OUTPATIENT)
Age: 59
End: 2024-10-31

## 2024-10-31 VITALS — SYSTOLIC BLOOD PRESSURE: 138 MMHG | DIASTOLIC BLOOD PRESSURE: 89 MMHG | HEART RATE: 85 BPM

## 2024-10-31 DIAGNOSIS — R82.81 PYURIA: ICD-10-CM

## 2024-10-31 DIAGNOSIS — R39.15 URGENCY OF URINATION: ICD-10-CM

## 2024-10-31 DIAGNOSIS — N20.0 RIGHT RENAL STONE: Primary | ICD-10-CM

## 2024-10-31 DIAGNOSIS — R35.0 FREQUENCY OF MICTURITION: ICD-10-CM

## 2024-10-31 LAB
BILIRUBIN, URINE, POC: NEGATIVE
BLOOD URINE, POC: ABNORMAL
GLUCOSE URINE, POC: 100
KETONES, URINE, POC: NEGATIVE
LEUKOCYTE ESTERASE, URINE, POC: ABNORMAL
NITRITE, URINE, POC: NEGATIVE
PH, URINE, POC: 5.5 (ref 4.6–8)
PROTEIN,URINE, POC: NEGATIVE
SPECIFIC GRAVITY, URINE, POC: 1.01 (ref 1–1.03)
URINALYSIS CLARITY, POC: CLEAR
URINALYSIS COLOR, POC: YELLOW
UROBILINOGEN, POC: ABNORMAL

## 2024-10-31 PROCEDURE — 81003 URINALYSIS AUTO W/O SCOPE: CPT | Performed by: UROLOGY

## 2024-10-31 PROCEDURE — G8484 FLU IMMUNIZE NO ADMIN: HCPCS | Performed by: UROLOGY

## 2024-10-31 PROCEDURE — G8420 CALC BMI NORM PARAMETERS: HCPCS | Performed by: UROLOGY

## 2024-10-31 PROCEDURE — 1036F TOBACCO NON-USER: CPT | Performed by: UROLOGY

## 2024-10-31 PROCEDURE — 3017F COLORECTAL CA SCREEN DOC REV: CPT | Performed by: UROLOGY

## 2024-10-31 PROCEDURE — 99214 OFFICE O/P EST MOD 30 MIN: CPT | Performed by: UROLOGY

## 2024-10-31 PROCEDURE — G8427 DOCREV CUR MEDS BY ELIG CLIN: HCPCS | Performed by: UROLOGY

## 2024-10-31 RX ORDER — DOXYCYCLINE HYCLATE 100 MG
100 TABLET ORAL 2 TIMES DAILY
Qty: 20 TABLET | Refills: 0 | Status: SHIPPED | OUTPATIENT
Start: 2024-10-31 | End: 2024-11-10

## 2024-10-31 NOTE — TELEPHONE ENCOUNTER
Patient called wanting to know if it was okay if she continues to take her Rx Celebrex. She stated that she doesn't know if it is still on her Rx list because she has a hard time getting it filled. But she forgot to ask Dr. White today if she could continue taking the Rx.

## 2024-10-31 NOTE — PROGRESS NOTES
HISTORY OF PRESENT ILLNESS  Yvonne Guo is a 59 y.o. female   Patient with excessive urgency and frequency her urine shows white cells may be irritation from the wire she had down her ureter I am going to put her on some doxycycline and she is on metformin.  See her back in 3 months repeat the CT scan to make sure all stones are gone  1. Right renal stone  Overview:  Us on 8/2024 reveled 16 mm nonobstructive right renal stone.    Ct scan on 9/2024 Large stone right lower pole collecting system. Smaller bilateral nonobstructing  renal stones. No ureteral stone or hydronephrosis    Urine culture negative for growth     She is s/p RIGHT PERCUTANEOUS ULTRASONIC NEPHROLITHOTOMY CYSTOURETHROSCOPY URETEROSCOPY LITHOTRIPSY RIGHT URETERAL STENT PLACEMENT WITH ANTEGRADE PYELOGRAM (Right: Kidney) on 10/8/2024  Findings: large stone in lower pole of kidney     10/2024 Ureteral catheter  and nephrostomy  tube were removed        Orders:  -     AMB POC URINALYSIS DIP STICK AUTO W/O MICRO  -     Culture, Urine  -     CT ABDOMEN WO CONTRAST Additional Contrast? None; Future  2. Pyuria  -     AMB POC URINALYSIS DIP STICK AUTO W/O MICRO  -     Culture, Urine  -     CT ABDOMEN WO CONTRAST Additional Contrast? None; Future  -     doxycycline hyclate (VIBRA-TABS) 100 MG tablet; Take 1 tablet by mouth 2 times daily for 10 days, Disp-20 tablet, R-0Normal  3. Frequency of micturition  -     doxycycline hyclate (VIBRA-TABS) 100 MG tablet; Take 1 tablet by mouth 2 times daily for 10 days, Disp-20 tablet, R-0Normal  4. Urgency of urination  -     doxycycline hyclate (VIBRA-TABS) 100 MG tablet; Take 1 tablet by mouth 2 times daily for 10 days, Disp-20 tablet, R-0Normal        PAST MEDICAL HISTORY  PMHx (including negatives):  has a past medical history of Bipolar 1 disorder (HCC), Diabetes mellitus (HCC), High cholesterol, Kidney stone, and Thyroid disease.   PSurgHx:  has a past surgical history that includes Cholecystectomy; Nasal septum

## 2024-11-01 NOTE — TELEPHONE ENCOUNTER
Her surgery was 10/8/24.  She is far enough out to resume Celebrex.  If she notes any blood in the urine, she should call office and hold the Celebrex.

## 2024-11-03 LAB — BACTERIA UR CULT: NORMAL

## 2024-11-11 SDOH — HEALTH STABILITY: PHYSICAL HEALTH: ON AVERAGE, HOW MANY DAYS PER WEEK DO YOU ENGAGE IN MODERATE TO STRENUOUS EXERCISE (LIKE A BRISK WALK)?: 4 DAYS

## 2024-11-13 ENCOUNTER — OFFICE VISIT (OUTPATIENT)
Dept: PRIMARY CARE CLINIC | Facility: CLINIC | Age: 59
End: 2024-11-13

## 2024-11-13 VITALS
BODY MASS INDEX: 25.33 KG/M2 | DIASTOLIC BLOOD PRESSURE: 83 MMHG | SYSTOLIC BLOOD PRESSURE: 112 MMHG | WEIGHT: 152 LBS | HEIGHT: 65 IN | OXYGEN SATURATION: 98 % | HEART RATE: 79 BPM | RESPIRATION RATE: 16 BRPM | TEMPERATURE: 98.3 F

## 2024-11-13 DIAGNOSIS — R14.0 ABDOMINAL BLOATING: ICD-10-CM

## 2024-11-13 DIAGNOSIS — Z76.89 ENCOUNTER TO ESTABLISH CARE: Primary | ICD-10-CM

## 2024-11-13 DIAGNOSIS — Z87.891 PERSONAL HISTORY OF NICOTINE DEPENDENCE: ICD-10-CM

## 2024-11-13 DIAGNOSIS — E78.5 HYPERLIPIDEMIA, UNSPECIFIED HYPERLIPIDEMIA TYPE: ICD-10-CM

## 2024-11-13 DIAGNOSIS — Z11.3 SCREEN FOR STD (SEXUALLY TRANSMITTED DISEASE): ICD-10-CM

## 2024-11-13 DIAGNOSIS — E11.9 TYPE 2 DIABETES MELLITUS WITHOUT COMPLICATION, WITHOUT LONG-TERM CURRENT USE OF INSULIN (HCC): ICD-10-CM

## 2024-11-13 PROBLEM — H52.4 PRESBYOPIA: Status: ACTIVE | Noted: 2024-11-13

## 2024-11-13 PROBLEM — H25.10 NUCLEAR SENILE CATARACT: Status: ACTIVE | Noted: 2024-11-13

## 2024-11-13 RX ORDER — DIAZEPAM 5 MG/1
5 TABLET ORAL NIGHTLY PRN
COMMUNITY
Start: 2024-11-05

## 2024-11-13 RX ORDER — TOPIRAMATE 50 MG/1
50 TABLET, FILM COATED ORAL DAILY
COMMUNITY
End: 2024-11-13

## 2024-11-13 RX ORDER — BACLOFEN 10 MG/1
10 TABLET ORAL
COMMUNITY
Start: 2024-10-30

## 2024-11-13 RX ORDER — NICOTINE 21 MG/24HR
1 PATCH, TRANSDERMAL 24 HOURS TRANSDERMAL EVERY 24 HOURS
COMMUNITY
End: 2024-11-13

## 2024-11-13 RX ORDER — LURASIDONE HYDROCHLORIDE 80 MG/1
80 TABLET, FILM COATED ORAL DAILY
COMMUNITY

## 2024-11-13 RX ORDER — OXYCODONE AND ACETAMINOPHEN 10; 325 MG/1; MG/1
1 TABLET ORAL EVERY 6 HOURS PRN
COMMUNITY

## 2024-11-13 RX ORDER — CELECOXIB 200 MG/1
200 CAPSULE ORAL DAILY
COMMUNITY

## 2024-11-13 SDOH — ECONOMIC STABILITY: INCOME INSECURITY: HOW HARD IS IT FOR YOU TO PAY FOR THE VERY BASICS LIKE FOOD, HOUSING, MEDICAL CARE, AND HEATING?: NOT HARD AT ALL

## 2024-11-13 SDOH — ECONOMIC STABILITY: FOOD INSECURITY: WITHIN THE PAST 12 MONTHS, THE FOOD YOU BOUGHT JUST DIDN'T LAST AND YOU DIDN'T HAVE MONEY TO GET MORE.: NEVER TRUE

## 2024-11-13 SDOH — ECONOMIC STABILITY: FOOD INSECURITY: WITHIN THE PAST 12 MONTHS, YOU WORRIED THAT YOUR FOOD WOULD RUN OUT BEFORE YOU GOT MONEY TO BUY MORE.: NEVER TRUE

## 2024-11-13 ASSESSMENT — ENCOUNTER SYMPTOMS
DIARRHEA: 0
SHORTNESS OF BREATH: 0
ABDOMINAL PAIN: 0
CONSTIPATION: 0

## 2024-11-13 ASSESSMENT — PATIENT HEALTH QUESTIONNAIRE - PHQ9
2. FEELING DOWN, DEPRESSED OR HOPELESS: NOT AT ALL
SUM OF ALL RESPONSES TO PHQ QUESTIONS 1-9: 0
1. LITTLE INTEREST OR PLEASURE IN DOING THINGS: NOT AT ALL
SUM OF ALL RESPONSES TO PHQ QUESTIONS 1-9: 0
SUM OF ALL RESPONSES TO PHQ9 QUESTIONS 1 & 2: 0
SUM OF ALL RESPONSES TO PHQ QUESTIONS 1-9: 0
SUM OF ALL RESPONSES TO PHQ QUESTIONS 1-9: 0

## 2024-11-13 NOTE — ASSESSMENT & PLAN NOTE
-Stable, well-controlled.  Last A1c 5.2% in October 2024  -Continue metformin 500 mg twice daily  -Diabetic foot exam performed in office today, and normal  -Urine microalbumin to creatinine ratio ordered today

## 2024-11-13 NOTE — PROGRESS NOTES
Chief Complaint   Patient presents with    Establish Care     /83 (Site: Left Upper Arm, Position: Sitting)   Pulse 79   Temp 98.3 °F (36.8 °C) (Oral)   Resp 16   Ht 1.651 m (5' 5\")   Wt 68.9 kg (152 lb)   SpO2 98%   BMI 25.29 kg/m²     \"Have you been to the ER, urgent care clinic since your last visit?  Hospitalized since your last visit?\"    NO    “Have you seen or consulted any other health care providers outside our system since your last visit?”    NO     “Have you had a pap smear?”    NO    No cervical cancer screening on file       “Have you had a colorectal cancer screening such as a colonoscopy/FIT/Cologuard?    NO    No colonoscopy on file  No cologuard on file  No FIT/FOBT on file   No flexible sigmoidoscopy on file     “Have you had a diabetic eye exam?”    NO     No diabetic eye exam on file

## 2024-11-13 NOTE — PROGRESS NOTES
PCP: Doris Howard DO    CC: Establish Care      Subjective      Yvonne Guo is a 59 y.o. female,New patient, who presents to establish care and with the following concerns. They were last seen by a provider within the last month (urology).    Lifestyle  Diet: Sandwiches, vegetables, fruit; drinking sweet tea, water, Ensure  Exercise: Walking around the house  Sleep: 6  hours    Concerns: Abdominal bloating and gassiness    Review of Systems   Constitutional:  Negative for chills, fever and unexpected weight change.   HENT: Negative.     Eyes:  Negative for visual disturbance.   Respiratory:  Negative for shortness of breath.    Gastrointestinal:  Negative for abdominal pain, constipation and diarrhea.   Genitourinary: Negative.    Musculoskeletal: Negative.    Skin:  Negative for rash.   Neurological:  Negative for dizziness and headaches.   Psychiatric/Behavioral:  Negative for dysphoric mood and suicidal ideas.        Patient Active Problem List   Diagnosis    Renal stone    Pyuria    Renal colic on right side    Right renal stone    Frequency of micturition    Urgency of urination    Nuclear senile cataract    Presbyopia    Type 2 diabetes mellitus without complication ([facility])       Current Outpatient Medications   Medication Sig Dispense Refill    baclofen (LIORESAL) 10 MG tablet Take 1 tablet by mouth      celecoxib (CELEBREX) 200 MG capsule Take 1 capsule by mouth daily      diazePAM (VALIUM) 5 MG tablet Take 1 tablet by mouth nightly as needed for Anxiety or Sleep.      diclofenac sodium (VOLTAREN) 1 % GEL Apply 2 g topically 4 times daily as needed for Pain      lurasidone (LATUDA) 80 MG TABS tablet Take 1 tablet by mouth daily      naloxone 4 MG/0.1ML LIQD nasal spray 1 spray by Nasal route as needed for Opioid Reversal      oxyCODONE-acetaminophen (PERCOCET)  MG per tablet Take 1 tablet by mouth every 6 hours as needed for Pain.      Simethicone 80 MG TABS Take 80 mg by mouth 4 times daily

## 2024-11-14 LAB
ALBUMIN/CREAT UR: <11 MG/G CREAT (ref 0–29)
CHOLEST SERPL-MCNC: 216 MG/DL (ref 100–199)
CREAT UR-MCNC: 28.3 MG/DL
HCV AB SERPL QL IA: NORMAL
HCV IGG SERPL QL IA: NON REACTIVE
HDLC SERPL-MCNC: 92 MG/DL
HIV 1+2 AB+HIV1 P24 AG SERPL QL IA: NON REACTIVE
LDLC SERPL CALC-MCNC: 109 MG/DL (ref 0–99)
MICROALBUMIN UR-MCNC: <3 UG/ML
TRIGL SERPL-MCNC: 85 MG/DL (ref 0–149)
VLDLC SERPL CALC-MCNC: 15 MG/DL (ref 5–40)

## 2024-11-18 ENCOUNTER — HOSPITAL ENCOUNTER (OUTPATIENT)
Facility: HOSPITAL | Age: 59
Discharge: HOME | End: 2024-11-21
Attending: ANESTHESIOLOGY
Payer: MEDICARE

## 2024-11-18 DIAGNOSIS — M54.12 RADICULOPATHY, CERVICAL REGION: ICD-10-CM

## 2024-11-18 DIAGNOSIS — M47.816 SPONDYLOSIS WITHOUT MYELOPATHY OR RADICULOPATHY, LUMBAR REGION: ICD-10-CM

## 2024-11-18 PROCEDURE — 72148 MRI LUMBAR SPINE W/O DYE: CPT

## 2024-11-18 PROCEDURE — 72141 MRI NECK SPINE W/O DYE: CPT

## 2024-11-22 DIAGNOSIS — M54.9 CHRONIC BACK PAIN GREATER THAN 3 MONTHS DURATION: Primary | ICD-10-CM

## 2024-11-22 DIAGNOSIS — G89.29 CHRONIC BACK PAIN GREATER THAN 3 MONTHS DURATION: Primary | ICD-10-CM

## 2024-12-02 ENCOUNTER — PATIENT MESSAGE (OUTPATIENT)
Dept: PRIMARY CARE CLINIC | Facility: CLINIC | Age: 59
End: 2024-12-02

## 2024-12-02 DIAGNOSIS — E11.9 TYPE 2 DIABETES MELLITUS WITHOUT COMPLICATION, WITHOUT LONG-TERM CURRENT USE OF INSULIN (HCC): Primary | ICD-10-CM

## 2024-12-03 RX ORDER — TIRZEPATIDE 2.5 MG/.5ML
2.5 INJECTION, SOLUTION SUBCUTANEOUS WEEKLY
Qty: 2 ML | Refills: 1 | Status: SHIPPED | OUTPATIENT
Start: 2024-12-03 | End: 2025-01-02

## 2024-12-04 ENCOUNTER — HOSPITAL ENCOUNTER (OUTPATIENT)
Facility: HOSPITAL | Age: 59
Discharge: HOME | End: 2024-12-07
Attending: FAMILY MEDICINE
Payer: MEDICARE

## 2024-12-04 DIAGNOSIS — Z87.891 PERSONAL HISTORY OF NICOTINE DEPENDENCE: ICD-10-CM

## 2024-12-04 PROCEDURE — 71271 CT THORAX LUNG CANCER SCR C-: CPT

## 2024-12-23 ENCOUNTER — TELEPHONE (OUTPATIENT)
Age: 59
End: 2024-12-23

## 2024-12-23 DIAGNOSIS — E03.9 HYPOTHYROIDISM, UNSPECIFIED TYPE: ICD-10-CM

## 2024-12-23 DIAGNOSIS — F39 MOOD DISORDER (HCC): ICD-10-CM

## 2024-12-23 DIAGNOSIS — E78.2 MIXED HYPERLIPIDEMIA: ICD-10-CM

## 2024-12-23 DIAGNOSIS — M54.50 CHRONIC BILATERAL LOW BACK PAIN WITHOUT SCIATICA: ICD-10-CM

## 2024-12-23 DIAGNOSIS — G47.00 INSOMNIA, UNSPECIFIED TYPE: ICD-10-CM

## 2024-12-23 DIAGNOSIS — K59.00 CONSTIPATION, UNSPECIFIED CONSTIPATION TYPE: ICD-10-CM

## 2024-12-23 DIAGNOSIS — E11.9 TYPE 2 DIABETES MELLITUS WITHOUT COMPLICATION, WITHOUT LONG-TERM CURRENT USE OF INSULIN (HCC): Primary | ICD-10-CM

## 2024-12-23 DIAGNOSIS — G89.29 CHRONIC BILATERAL LOW BACK PAIN WITHOUT SCIATICA: ICD-10-CM

## 2024-12-23 DIAGNOSIS — K21.9 GASTROESOPHAGEAL REFLUX DISEASE WITHOUT ESOPHAGITIS: ICD-10-CM

## 2024-12-23 RX ORDER — OMEPRAZOLE 40 MG/1
40 CAPSULE, DELAYED RELEASE ORAL DAILY
Qty: 90 CAPSULE | Refills: 0 | Status: SHIPPED | OUTPATIENT
Start: 2024-12-23 | End: 2025-03-23

## 2024-12-23 RX ORDER — BACLOFEN 10 MG/1
10 TABLET ORAL 3 TIMES DAILY
Qty: 270 TABLET | Refills: 0 | Status: SHIPPED | OUTPATIENT
Start: 2024-12-23 | End: 2025-03-23

## 2024-12-23 RX ORDER — LINACLOTIDE 290 UG/1
1 CAPSULE, GELATIN COATED ORAL
Qty: 90 CAPSULE | Refills: 0 | Status: SHIPPED | OUTPATIENT
Start: 2024-12-23 | End: 2025-03-23

## 2024-12-23 RX ORDER — METFORMIN HYDROCHLORIDE 500 MG/1
500 TABLET, EXTENDED RELEASE ORAL 2 TIMES DAILY
Qty: 180 TABLET | Refills: 0 | Status: SHIPPED | OUTPATIENT
Start: 2024-12-23 | End: 2025-03-23

## 2024-12-23 RX ORDER — CELECOXIB 200 MG/1
200 CAPSULE ORAL DAILY
Qty: 90 CAPSULE | Refills: 0 | Status: SHIPPED | OUTPATIENT
Start: 2024-12-23 | End: 2025-03-23

## 2024-12-23 RX ORDER — LEVOTHYROXINE SODIUM 25 UG/1
25 TABLET ORAL DAILY
Qty: 90 TABLET | Refills: 0 | Status: SHIPPED | OUTPATIENT
Start: 2024-12-23 | End: 2025-03-23

## 2024-12-23 RX ORDER — SIMVASTATIN 40 MG
40 TABLET ORAL NIGHTLY
Qty: 90 TABLET | Refills: 0 | Status: SHIPPED | OUTPATIENT
Start: 2024-12-23 | End: 2025-03-23

## 2024-12-23 RX ORDER — TRAZODONE HYDROCHLORIDE 100 MG/1
100 TABLET ORAL 3 TIMES DAILY
Qty: 270 TABLET | Refills: 0 | Status: SHIPPED | OUTPATIENT
Start: 2024-12-23 | End: 2025-03-23

## 2024-12-23 RX ORDER — LURASIDONE HYDROCHLORIDE 80 MG/1
80 TABLET, FILM COATED ORAL DAILY
Qty: 90 TABLET | Refills: 0 | Status: SHIPPED | OUTPATIENT
Start: 2024-12-23 | End: 2025-03-23

## 2024-12-23 NOTE — PROGRESS NOTES
90-day refills for current medications not including controlled substances sent to pharmacy per patient request.

## 2024-12-23 NOTE — TELEPHONE ENCOUNTER
Patient called and stated that she is moving to [place]. She wanted to know if Dr. White would like for her to establish with a Urologist once moving to [place].

## 2024-12-24 RX ORDER — DOCUSATE SODIUM 100 MG/1
CAPSULE, LIQUID FILLED ORAL
Qty: 30 CAPSULE | Refills: 0 | OUTPATIENT
Start: 2024-12-24

## 2024-12-24 RX ORDER — CIPROFLOXACIN 250 MG/1
250 TABLET, FILM COATED ORAL 2 TIMES DAILY
Qty: 14 TABLET | Refills: 0 | OUTPATIENT
Start: 2024-12-24 | End: 2024-12-31

## (undated) DEVICE — SOLUTION IRRIG 3000ML 0.9% SOD CHL USP UROMATIC PLAS CONT

## (undated) DEVICE — 3M™ TEGADERM™ TRANSPARENT FILM DRESSING FRAME STYLE, 1626W, 4 IN X 4-3/4 IN (10 CM X 12 CM), 50/CT 4CT/CASE: Brand: 3M™ TEGADERM™

## (undated) DEVICE — 3M™ MICROFOAM™ TAPE 1528-4: Brand: 3M™ MICROFOAM™

## (undated) DEVICE — SAN ANTONIO STOPCOCK: Brand: COOK

## (undated) DEVICE — DRAPE EQUIP C ARM 74X42 IN MOB XR W/ TIE RUBBER BND LF

## (undated) DEVICE — Z DUP USE 2389341 INSERT CUSH STD PRONEVIEW

## (undated) DEVICE — LUER-LOK 360°: Brand: CONNECTA, LUER-LOK

## (undated) DEVICE — AMPLATZ ULTRA STIFF WIRE GUIDE: Brand: AMPLATZ

## (undated) DEVICE — AGENT HEMOSTATIC SURGIFLOW MATRIX KIT W/THROMBIN

## (undated) DEVICE — CYSTO PACK: Brand: MEDLINE INDUSTRIES, INC.

## (undated) DEVICE — MINOR GENERAL: Brand: MEDLINE INDUSTRIES, INC.

## (undated) DEVICE — SOLUTION IRRIG 500ML 0.9% SOD CHLO USP POUR PLAS BTL

## (undated) DEVICE — BAG,DRAINAGE,4L,A/R TOWER,LL,SLIDE TAP: Brand: MEDLINE

## (undated) DEVICE — TOTAL TRAY, 16FR 10ML SIL FOLEY, URN: Brand: MEDLINE

## (undated) DEVICE — SOLUTION IV 500ML 0.9% SOD CHL PH 5 INJ USP VIAFLX PLAS

## (undated) DEVICE — GUIDEWIRE ENDOSCP L150CM DIA0.035IN TIP 3CM PTFE NIT

## (undated) DEVICE — 3M™ STERI-DRAPE™ INCISE DRAPE 1050 (60CM X 45CM): Brand: STERI-DRAPE™

## (undated) DEVICE — PROBE ULTRASONIC LITHO 3.5X370MM RIG URETRON

## (undated) DEVICE — PAD FLR 33X40 IN ABSORBENT NS DRIFLOOR

## (undated) DEVICE — DRAPE,NEPHROSCOPY,STERILE: Brand: MEDLINE

## (undated) DEVICE — SUTURE PERMA-HAND SZ 2-0 L30IN NONABSORBABLE BLK L26MM SH K833H

## (undated) DEVICE — SPONGE LAP SFT 18X18 IN X RAY DETECTABLE

## (undated) DEVICE — PAD,ABDOMINAL,8"X7.5",STERILE,LF,1/PK: Brand: MEDLINE

## (undated) DEVICE — CONNECTOR CATH URET SIDE PRT FOR FRIC CONN UCA595] GU TEK]

## (undated) DEVICE — PERC NCIRCLE, NITINOL TIPLESS STONE EXTRACTOR: Brand: PERC NCIRCLE

## (undated) DEVICE — CATH NEPHROSTOMY BLLN DILAT XFORCE 15CM 8MM

## (undated) DEVICE — SOLUTION IRRIG 500ML STRL H2O NONPYROGENIC

## (undated) DEVICE — SEAL INSTR PRT SELF SEAL

## (undated) DEVICE — SET EXTN PRIMING 5ML L40IN PINCH CLMP SMRTSITE NDL FREE VLV

## (undated) DEVICE — PAD,PREPPING,CUFFED,24X48,7",NONSTERILE: Brand: MEDLINE

## (undated) DEVICE — Device

## (undated) DEVICE — VALVE SUCTION PINCH URETRON

## (undated) DEVICE — DEVICE INFLATION EAGLE 20ML

## (undated) DEVICE — SOLUTION SCRB 4OZ 4% CHG H2O AIDED FOR PREOPERATIVE SKIN

## (undated) DEVICE — SUTURE NONABSORBABLE MONOFILAMENT 2-0 FS 18 IN ETHILON 664H

## (undated) DEVICE — LUER LOCK SET SINGLE LUMAN

## (undated) DEVICE — BLADE,STAINLESS-STEEL,11,STRL,DISPOSABLE: Brand: MEDLINE

## (undated) DEVICE — CATHETER ETER URET 5FR L70CM 0038IN FLX OPN TIP NO SIDE EYE

## (undated) DEVICE — LAMINECTOMY ARM CRADLE FOAM POSITIONER: Brand: CARDINAL HEALTH

## (undated) DEVICE — SYRINGE MED 50ML LUERLOCK TIP

## (undated) DEVICE — ADMINISTRATION SET SECONDARY 20 GTT 31 IN 11 CC N VENTED

## (undated) DEVICE — GAUZE,SPONGE,4"X4",16PLY,STRL,LF,10/TRAY: Brand: MEDLINE

## (undated) DEVICE — ELECTRODE PT RET AD L9FT HI MOIST COND ADH HYDRGEL CORDED

## (undated) DEVICE — CATHETER URETH BLLN 5CC 20FR F 2 W SPEC COUNCL MOD SHT OPN

## (undated) DEVICE — PREP PAD BNS: Brand: CONVERTORS

## (undated) DEVICE — GOWN,SIRUS,POLYRNF,BRTHSLV,XLN/XL,20/CS: Brand: MEDLINE

## (undated) DEVICE — CATHETER ANGIO 5FR L65CM 0.038IN BERN SEL SFT RADPQ TIP HI

## (undated) DEVICE — TUBING SUCTION UNIV 3/16 INX20 FT W/ SCALLOPED CONN STRL

## (undated) DEVICE — GUIDEWIRE UROLOGICAL ANGLED 3 CM 0.035 INX150 CM NIT SENSOR